# Patient Record
Sex: MALE | Race: WHITE | NOT HISPANIC OR LATINO | URBAN - METROPOLITAN AREA
[De-identification: names, ages, dates, MRNs, and addresses within clinical notes are randomized per-mention and may not be internally consistent; named-entity substitution may affect disease eponyms.]

---

## 2017-07-24 ENCOUNTER — APPOINTMENT (OUTPATIENT)
Dept: RADIOLOGY | Facility: MEDICAL CENTER | Age: 57
DRG: 308 | End: 2017-07-24
Attending: EMERGENCY MEDICINE
Payer: COMMERCIAL

## 2017-07-24 ENCOUNTER — HOSPITAL ENCOUNTER (INPATIENT)
Facility: MEDICAL CENTER | Age: 57
LOS: 2 days | DRG: 308 | End: 2017-07-26
Attending: EMERGENCY MEDICINE | Admitting: HOSPITALIST
Payer: COMMERCIAL

## 2017-07-24 ENCOUNTER — RESOLUTE PROFESSIONAL BILLING HOSPITAL PROF FEE (OUTPATIENT)
Dept: HOSPITALIST | Facility: MEDICAL CENTER | Age: 57
End: 2017-07-24
Payer: COMMERCIAL

## 2017-07-24 DIAGNOSIS — I48.91 ATRIAL FIBRILLATION, NEW ONSET (HCC): ICD-10-CM

## 2017-07-24 DIAGNOSIS — R06.02 SHORTNESS OF BREATH: ICD-10-CM

## 2017-07-24 PROBLEM — K21.9 GASTROESOPHAGEAL REFLUX DISEASE: Status: ACTIVE | Noted: 2017-07-24

## 2017-07-24 PROBLEM — D64.9 NORMOCHROMIC NORMOCYTIC ANEMIA: Status: ACTIVE | Noted: 2017-07-24

## 2017-07-24 PROBLEM — G47.33 OBSTRUCTIVE SLEEP APNEA: Status: ACTIVE | Noted: 2017-07-24

## 2017-07-24 PROBLEM — E66.01 MORBID OBESITY (HCC): Status: ACTIVE | Noted: 2017-07-24

## 2017-07-24 PROBLEM — E83.42 HYPOMAGNESEMIA: Status: ACTIVE | Noted: 2017-07-24

## 2017-07-24 PROBLEM — D68.9 COAGULOPATHY (HCC): Status: ACTIVE | Noted: 2017-07-24

## 2017-07-24 PROBLEM — D69.6 THROMBOCYTOPENIA (HCC): Status: ACTIVE | Noted: 2017-07-24

## 2017-07-24 PROBLEM — I10 HYPERTENSION: Status: ACTIVE | Noted: 2017-07-24

## 2017-07-24 PROBLEM — F10.10 ALCOHOL ABUSE: Status: ACTIVE | Noted: 2017-07-24

## 2017-07-24 LAB
ALBUMIN SERPL BCP-MCNC: 3.9 G/DL (ref 3.2–4.9)
ALBUMIN/GLOB SERPL: 1.2 G/DL
ALP SERPL-CCNC: 50 U/L (ref 30–99)
ALT SERPL-CCNC: 22 U/L (ref 2–50)
AMMONIA PLAS-SCNC: 34 UMOL/L (ref 11–45)
ANION GAP SERPL CALC-SCNC: 11 MMOL/L (ref 0–11.9)
APTT PPP: 35.2 SEC (ref 24.7–36)
AST SERPL-CCNC: 26 U/L (ref 12–45)
BASOPHILS # BLD AUTO: 1.2 % (ref 0–1.8)
BASOPHILS # BLD: 0.09 K/UL (ref 0–0.12)
BILIRUB SERPL-MCNC: 0.8 MG/DL (ref 0.1–1.5)
BNP SERPL-MCNC: 225 PG/ML (ref 0–100)
BNP SERPL-MCNC: 251 PG/ML (ref 0–100)
BUN SERPL-MCNC: 22 MG/DL (ref 8–22)
CALCIUM SERPL-MCNC: 9.2 MG/DL (ref 8.5–10.5)
CHLORIDE SERPL-SCNC: 102 MMOL/L (ref 96–112)
CO2 SERPL-SCNC: 25 MMOL/L (ref 20–33)
CREAT SERPL-MCNC: 1.12 MG/DL (ref 0.5–1.4)
DEPRECATED D DIMER PPP IA-ACNC: 652 NG/ML(D-DU)
EKG IMPRESSION: NORMAL
EOSINOPHIL # BLD AUTO: 0.18 K/UL (ref 0–0.51)
EOSINOPHIL NFR BLD: 2.4 % (ref 0–6.9)
ERYTHROCYTE [DISTWIDTH] IN BLOOD BY AUTOMATED COUNT: 47.7 FL (ref 35.9–50)
EST. AVERAGE GLUCOSE BLD GHB EST-MCNC: 146 MG/DL
GFR SERPL CREATININE-BSD FRML MDRD: >60 ML/MIN/1.73 M 2
GLOBULIN SER CALC-MCNC: 3.2 G/DL (ref 1.9–3.5)
GLUCOSE BLD-MCNC: 120 MG/DL (ref 65–99)
GLUCOSE BLD-MCNC: 129 MG/DL (ref 65–99)
GLUCOSE SERPL-MCNC: 109 MG/DL (ref 65–99)
HAV IGM SERPL QL IA: NEGATIVE
HBA1C MFR BLD: 6.7 % (ref 0–5.6)
HBV CORE IGM SER QL: NEGATIVE
HBV SURFACE AG SER QL: NEGATIVE
HCT VFR BLD AUTO: 38.6 % (ref 42–52)
HCV AB SER QL: NEGATIVE
HGB BLD-MCNC: 12.5 G/DL (ref 14–18)
HGB RETIC QN AUTO: 28.9 PG/CELL (ref 29–35)
IMM GRANULOCYTES # BLD AUTO: 0.02 K/UL (ref 0–0.11)
IMM GRANULOCYTES NFR BLD AUTO: 0.3 % (ref 0–0.9)
IMM RETICS NFR: 24.1 % (ref 9.3–17.4)
INR PPP: 1.14 (ref 0.87–1.13)
IRON SATN MFR SERPL: 13 % (ref 15–55)
IRON SERPL-MCNC: 76 UG/DL (ref 50–180)
LACTATE BLD-SCNC: 2 MMOL/L (ref 0.5–2)
LV EJECT FRACT  99904: 35
LYMPHOCYTES # BLD AUTO: 1.18 K/UL (ref 1–4.8)
LYMPHOCYTES NFR BLD: 15.5 % (ref 22–41)
MAGNESIUM SERPL-MCNC: 1.3 MG/DL (ref 1.5–2.5)
MCH RBC QN AUTO: 31 PG (ref 27–33)
MCHC RBC AUTO-ENTMCNC: 32.4 G/DL (ref 33.7–35.3)
MCV RBC AUTO: 95.8 FL (ref 81.4–97.8)
MONOCYTES # BLD AUTO: 0.75 K/UL (ref 0–0.85)
MONOCYTES NFR BLD AUTO: 9.8 % (ref 0–13.4)
NEUTROPHILS # BLD AUTO: 5.4 K/UL (ref 1.82–7.42)
NEUTROPHILS NFR BLD: 70.8 % (ref 44–72)
NRBC # BLD AUTO: 0.07 K/UL
NRBC BLD AUTO-RTO: 0.9 /100 WBC
PHOSPHATE SERPL-MCNC: 3.7 MG/DL (ref 2.5–4.5)
PLATELET # BLD AUTO: 148 K/UL (ref 164–446)
PMV BLD AUTO: 11.8 FL (ref 9–12.9)
POTASSIUM SERPL-SCNC: 3.7 MMOL/L (ref 3.6–5.5)
PROT SERPL-MCNC: 7.1 G/DL (ref 6–8.2)
PROTHROMBIN TIME: 15 SEC (ref 12–14.6)
RBC # BLD AUTO: 4.03 M/UL (ref 4.7–6.1)
RETICS # AUTO: 0.09 M/UL (ref 0.04–0.06)
RETICS/RBC NFR: 2.4 % (ref 0.8–2.1)
SODIUM SERPL-SCNC: 138 MMOL/L (ref 135–145)
T4 FREE SERPL-MCNC: 1.02 NG/DL (ref 0.53–1.43)
TIBC SERPL-MCNC: 584 UG/DL (ref 250–450)
TROPONIN I SERPL-MCNC: 0.03 NG/ML (ref 0–0.04)
TROPONIN I SERPL-MCNC: 0.05 NG/ML (ref 0–0.04)
TSH SERPL DL<=0.005 MIU/L-ACNC: 1.11 UIU/ML (ref 0.3–3.7)
TSH SERPL DL<=0.005 MIU/L-ACNC: 1.54 UIU/ML (ref 0.3–3.7)
WBC # BLD AUTO: 7.6 K/UL (ref 4.8–10.8)

## 2017-07-24 PROCEDURE — 93005 ELECTROCARDIOGRAM TRACING: CPT | Performed by: HOSPITALIST

## 2017-07-24 PROCEDURE — 700102 HCHG RX REV CODE 250 W/ 637 OVERRIDE(OP): Performed by: HOSPITALIST

## 2017-07-24 PROCEDURE — 71275 CT ANGIOGRAPHY CHEST: CPT

## 2017-07-24 PROCEDURE — 83735 ASSAY OF MAGNESIUM: CPT | Mod: 91

## 2017-07-24 PROCEDURE — 700101 HCHG RX REV CODE 250: Performed by: HOSPITALIST

## 2017-07-24 PROCEDURE — 85025 COMPLETE CBC W/AUTO DIFF WBC: CPT

## 2017-07-24 PROCEDURE — 80074 ACUTE HEPATITIS PANEL: CPT

## 2017-07-24 PROCEDURE — 71010 DX-CHEST-PORTABLE (1 VIEW): CPT

## 2017-07-24 PROCEDURE — 83550 IRON BINDING TEST: CPT

## 2017-07-24 PROCEDURE — 700111 HCHG RX REV CODE 636 W/ 250 OVERRIDE (IP): Performed by: HOSPITALIST

## 2017-07-24 PROCEDURE — 84100 ASSAY OF PHOSPHORUS: CPT | Mod: 91

## 2017-07-24 PROCEDURE — 85046 RETICYTE/HGB CONCENTRATE: CPT

## 2017-07-24 PROCEDURE — 93005 ELECTROCARDIOGRAM TRACING: CPT | Performed by: EMERGENCY MEDICINE

## 2017-07-24 PROCEDURE — 80061 LIPID PANEL: CPT

## 2017-07-24 PROCEDURE — 83605 ASSAY OF LACTIC ACID: CPT

## 2017-07-24 PROCEDURE — A9270 NON-COVERED ITEM OR SERVICE: HCPCS | Performed by: HOSPITALIST

## 2017-07-24 PROCEDURE — 85379 FIBRIN DEGRADATION QUANT: CPT

## 2017-07-24 PROCEDURE — 36415 COLL VENOUS BLD VENIPUNCTURE: CPT

## 2017-07-24 PROCEDURE — 700105 HCHG RX REV CODE 258: Performed by: HOSPITALIST

## 2017-07-24 PROCEDURE — 83036 HEMOGLOBIN GLYCOSYLATED A1C: CPT

## 2017-07-24 PROCEDURE — 99223 1ST HOSP IP/OBS HIGH 75: CPT | Performed by: HOSPITALIST

## 2017-07-24 PROCEDURE — 700102 HCHG RX REV CODE 250 W/ 637 OVERRIDE(OP): Performed by: STUDENT IN AN ORGANIZED HEALTH CARE EDUCATION/TRAINING PROGRAM

## 2017-07-24 PROCEDURE — A9270 NON-COVERED ITEM OR SERVICE: HCPCS | Performed by: STUDENT IN AN ORGANIZED HEALTH CARE EDUCATION/TRAINING PROGRAM

## 2017-07-24 PROCEDURE — 93306 TTE W/DOPPLER COMPLETE: CPT

## 2017-07-24 PROCEDURE — 84439 ASSAY OF FREE THYROXINE: CPT

## 2017-07-24 PROCEDURE — 82140 ASSAY OF AMMONIA: CPT | Mod: 91

## 2017-07-24 PROCEDURE — 80053 COMPREHEN METABOLIC PANEL: CPT

## 2017-07-24 PROCEDURE — 80048 BASIC METABOLIC PNL TOTAL CA: CPT

## 2017-07-24 PROCEDURE — 93005 ELECTROCARDIOGRAM TRACING: CPT

## 2017-07-24 PROCEDURE — 83880 ASSAY OF NATRIURETIC PEPTIDE: CPT

## 2017-07-24 PROCEDURE — 85027 COMPLETE CBC AUTOMATED: CPT

## 2017-07-24 PROCEDURE — 99285 EMERGENCY DEPT VISIT HI MDM: CPT

## 2017-07-24 PROCEDURE — 700111 HCHG RX REV CODE 636 W/ 250 OVERRIDE (IP): Performed by: INTERNAL MEDICINE

## 2017-07-24 PROCEDURE — 85730 THROMBOPLASTIN TIME PARTIAL: CPT

## 2017-07-24 PROCEDURE — 84443 ASSAY THYROID STIM HORMONE: CPT

## 2017-07-24 PROCEDURE — 93306 TTE W/DOPPLER COMPLETE: CPT | Mod: 26 | Performed by: INTERNAL MEDICINE

## 2017-07-24 PROCEDURE — 84484 ASSAY OF TROPONIN QUANT: CPT | Mod: 91

## 2017-07-24 PROCEDURE — 770020 HCHG ROOM/CARE - TELE (206)

## 2017-07-24 PROCEDURE — 82962 GLUCOSE BLOOD TEST: CPT

## 2017-07-24 PROCEDURE — 85610 PROTHROMBIN TIME: CPT

## 2017-07-24 PROCEDURE — 83540 ASSAY OF IRON: CPT

## 2017-07-24 RX ORDER — DILTIAZEM HYDROCHLORIDE 5 MG/ML
0.25 INJECTION INTRAVENOUS ONCE
Status: DISCONTINUED | OUTPATIENT
Start: 2017-07-24 | End: 2017-07-24

## 2017-07-24 RX ORDER — OMEPRAZOLE 20 MG/1
20 CAPSULE, DELAYED RELEASE ORAL DAILY
Status: DISCONTINUED | OUTPATIENT
Start: 2017-07-24 | End: 2017-07-26 | Stop reason: HOSPADM

## 2017-07-24 RX ORDER — CLONIDINE HYDROCHLORIDE 0.1 MG/1
0.1 TABLET ORAL EVERY 6 HOURS PRN
Status: DISCONTINUED | OUTPATIENT
Start: 2017-07-24 | End: 2017-07-26 | Stop reason: HOSPADM

## 2017-07-24 RX ORDER — MAGNESIUM SULFATE HEPTAHYDRATE 40 MG/ML
2 INJECTION, SOLUTION INTRAVENOUS ONCE
Status: COMPLETED | OUTPATIENT
Start: 2017-07-24 | End: 2017-07-24

## 2017-07-24 RX ORDER — LORAZEPAM 0.5 MG/1
0.5 TABLET ORAL EVERY 4 HOURS PRN
Status: DISCONTINUED | OUTPATIENT
Start: 2017-07-24 | End: 2017-07-26 | Stop reason: HOSPADM

## 2017-07-24 RX ORDER — BISACODYL 10 MG
10 SUPPOSITORY, RECTAL RECTAL
Status: DISCONTINUED | OUTPATIENT
Start: 2017-07-24 | End: 2017-07-26 | Stop reason: HOSPADM

## 2017-07-24 RX ORDER — POLYETHYLENE GLYCOL 3350 17 G/17G
1 POWDER, FOR SOLUTION ORAL
Status: DISCONTINUED | OUTPATIENT
Start: 2017-07-24 | End: 2017-07-26 | Stop reason: HOSPADM

## 2017-07-24 RX ORDER — LORAZEPAM 2 MG/ML
0.5 INJECTION INTRAMUSCULAR EVERY 6 HOURS PRN
Status: DISCONTINUED | OUTPATIENT
Start: 2017-07-24 | End: 2017-07-24

## 2017-07-24 RX ORDER — LORAZEPAM 1 MG/1
0.5 TABLET ORAL EVERY 6 HOURS PRN
Status: DISCONTINUED | OUTPATIENT
Start: 2017-07-24 | End: 2017-07-24

## 2017-07-24 RX ORDER — LORAZEPAM 2 MG/ML
1.5 INJECTION INTRAMUSCULAR
Status: DISCONTINUED | OUTPATIENT
Start: 2017-07-24 | End: 2017-07-26 | Stop reason: HOSPADM

## 2017-07-24 RX ORDER — ONDANSETRON 2 MG/ML
4 INJECTION INTRAMUSCULAR; INTRAVENOUS EVERY 4 HOURS PRN
Status: DISCONTINUED | OUTPATIENT
Start: 2017-07-24 | End: 2017-07-26 | Stop reason: HOSPADM

## 2017-07-24 RX ORDER — OMEPRAZOLE 20 MG/1
20 CAPSULE, DELAYED RELEASE ORAL DAILY
COMMUNITY

## 2017-07-24 RX ORDER — LORAZEPAM 1 MG/1
1 TABLET ORAL EVERY 4 HOURS PRN
Status: DISCONTINUED | OUTPATIENT
Start: 2017-07-24 | End: 2017-07-26 | Stop reason: HOSPADM

## 2017-07-24 RX ORDER — LORAZEPAM 2 MG/ML
2 INJECTION INTRAMUSCULAR
Status: DISCONTINUED | OUTPATIENT
Start: 2017-07-24 | End: 2017-07-26 | Stop reason: HOSPADM

## 2017-07-24 RX ORDER — ACETAMINOPHEN 325 MG/1
650 TABLET ORAL EVERY 6 HOURS PRN
Status: DISCONTINUED | OUTPATIENT
Start: 2017-07-24 | End: 2017-07-26 | Stop reason: HOSPADM

## 2017-07-24 RX ORDER — DILTIAZEM HYDROCHLORIDE 5 MG/ML
10 INJECTION INTRAVENOUS 4 TIMES DAILY PRN
Status: DISCONTINUED | OUTPATIENT
Start: 2017-07-24 | End: 2017-07-26 | Stop reason: HOSPADM

## 2017-07-24 RX ORDER — PROMETHAZINE HYDROCHLORIDE 25 MG/1
12.5-25 TABLET ORAL EVERY 4 HOURS PRN
Status: DISCONTINUED | OUTPATIENT
Start: 2017-07-24 | End: 2017-07-26 | Stop reason: HOSPADM

## 2017-07-24 RX ORDER — HYDROCHLOROTHIAZIDE 25 MG/1
25 TABLET ORAL DAILY
Status: ON HOLD | COMMUNITY
End: 2017-07-26

## 2017-07-24 RX ORDER — FOLIC ACID 1 MG/1
1 TABLET ORAL DAILY
Status: DISCONTINUED | OUTPATIENT
Start: 2017-07-25 | End: 2017-07-26 | Stop reason: HOSPADM

## 2017-07-24 RX ORDER — LORAZEPAM 1 MG/1
4 TABLET ORAL
Status: DISCONTINUED | OUTPATIENT
Start: 2017-07-24 | End: 2017-07-26 | Stop reason: HOSPADM

## 2017-07-24 RX ORDER — THIAMINE MONONITRATE (VIT B1) 100 MG
100 TABLET ORAL DAILY
Status: DISCONTINUED | OUTPATIENT
Start: 2017-07-25 | End: 2017-07-26 | Stop reason: HOSPADM

## 2017-07-24 RX ORDER — AMOXICILLIN 250 MG
2 CAPSULE ORAL 2 TIMES DAILY
Status: DISCONTINUED | OUTPATIENT
Start: 2017-07-24 | End: 2017-07-26 | Stop reason: HOSPADM

## 2017-07-24 RX ORDER — DILTIAZEM HYDROCHLORIDE 5 MG/ML
10 INJECTION INTRAVENOUS ONCE
Status: COMPLETED | OUTPATIENT
Start: 2017-07-24 | End: 2017-07-24

## 2017-07-24 RX ORDER — LORAZEPAM 1 MG/1
2 TABLET ORAL
Status: DISCONTINUED | OUTPATIENT
Start: 2017-07-24 | End: 2017-07-26 | Stop reason: HOSPADM

## 2017-07-24 RX ORDER — DEXTROSE MONOHYDRATE 25 G/50ML
25 INJECTION, SOLUTION INTRAVENOUS
Status: DISCONTINUED | OUTPATIENT
Start: 2017-07-24 | End: 2017-07-26 | Stop reason: HOSPADM

## 2017-07-24 RX ORDER — LABETALOL HYDROCHLORIDE 5 MG/ML
10 INJECTION, SOLUTION INTRAVENOUS EVERY 4 HOURS PRN
Status: DISCONTINUED | OUTPATIENT
Start: 2017-07-24 | End: 2017-07-24

## 2017-07-24 RX ORDER — PROMETHAZINE HYDROCHLORIDE 25 MG/1
12.5-25 SUPPOSITORY RECTAL EVERY 4 HOURS PRN
Status: DISCONTINUED | OUTPATIENT
Start: 2017-07-24 | End: 2017-07-26 | Stop reason: HOSPADM

## 2017-07-24 RX ORDER — LORAZEPAM 2 MG/ML
1 INJECTION INTRAMUSCULAR
Status: DISCONTINUED | OUTPATIENT
Start: 2017-07-24 | End: 2017-07-26 | Stop reason: HOSPADM

## 2017-07-24 RX ORDER — LORAZEPAM 1 MG/1
3 TABLET ORAL
Status: DISCONTINUED | OUTPATIENT
Start: 2017-07-24 | End: 2017-07-26 | Stop reason: HOSPADM

## 2017-07-24 RX ORDER — LOSARTAN POTASSIUM 50 MG/1
50 TABLET ORAL DAILY
Status: DISCONTINUED | OUTPATIENT
Start: 2017-07-24 | End: 2017-07-26

## 2017-07-24 RX ORDER — LOSARTAN POTASSIUM 50 MG/1
50 TABLET ORAL DAILY
COMMUNITY

## 2017-07-24 RX ORDER — CHLORDIAZEPOXIDE HYDROCHLORIDE 25 MG/1
25 CAPSULE, GELATIN COATED ORAL EVERY 6 HOURS
Status: DISCONTINUED | OUTPATIENT
Start: 2017-07-25 | End: 2017-07-25

## 2017-07-24 RX ORDER — LORAZEPAM 2 MG/ML
0.5 INJECTION INTRAMUSCULAR EVERY 4 HOURS PRN
Status: DISCONTINUED | OUTPATIENT
Start: 2017-07-24 | End: 2017-07-26 | Stop reason: HOSPADM

## 2017-07-24 RX ORDER — METOPROLOL TARTRATE 50 MG/1
50 TABLET, FILM COATED ORAL TWICE DAILY
Status: DISCONTINUED | OUTPATIENT
Start: 2017-07-24 | End: 2017-07-26

## 2017-07-24 RX ORDER — IBUPROFEN 200 MG
1000 TABLET ORAL EVERY 6 HOURS PRN
Status: ON HOLD | COMMUNITY
End: 2017-07-26

## 2017-07-24 RX ORDER — ONDANSETRON 4 MG/1
4 TABLET, ORALLY DISINTEGRATING ORAL EVERY 4 HOURS PRN
Status: DISCONTINUED | OUTPATIENT
Start: 2017-07-24 | End: 2017-07-26 | Stop reason: HOSPADM

## 2017-07-24 RX ORDER — CHLORDIAZEPOXIDE HYDROCHLORIDE 25 MG/1
50 CAPSULE, GELATIN COATED ORAL EVERY 6 HOURS
Status: DISCONTINUED | OUTPATIENT
Start: 2017-07-24 | End: 2017-07-25

## 2017-07-24 RX ADMIN — DILTIAZEM HYDROCHLORIDE 10 MG: 5 INJECTION INTRAVENOUS at 15:26

## 2017-07-24 RX ADMIN — STANDARDIZED SENNA CONCENTRATE AND DOCUSATE SODIUM 2 TABLET: 8.6; 5 TABLET, FILM COATED ORAL at 20:59

## 2017-07-24 RX ADMIN — CHLORDIAZEPOXIDE HYDROCHLORIDE 50 MG: 25 CAPSULE ORAL at 20:59

## 2017-07-24 RX ADMIN — METOPROLOL TARTRATE 50 MG: 50 TABLET, FILM COATED ORAL at 15:25

## 2017-07-24 RX ADMIN — ACETAMINOPHEN 650 MG: 325 TABLET, FILM COATED ORAL at 20:58

## 2017-07-24 RX ADMIN — RIVAROXABAN 20 MG: 20 TABLET, FILM COATED ORAL at 15:24

## 2017-07-24 RX ADMIN — MAGNESIUM SULFATE IN WATER 2 G: 40 INJECTION, SOLUTION INTRAVENOUS at 15:58

## 2017-07-24 RX ADMIN — CHLORDIAZEPOXIDE HYDROCHLORIDE 50 MG: 25 CAPSULE ORAL at 14:16

## 2017-07-24 RX ADMIN — METFORMIN HYDROCHLORIDE 500 MG: 500 TABLET, FILM COATED ORAL at 18:15

## 2017-07-24 RX ADMIN — LOSARTAN POTASSIUM 50 MG: 50 TABLET, FILM COATED ORAL at 14:15

## 2017-07-24 RX ADMIN — OMEPRAZOLE 20 MG: 20 CAPSULE, DELAYED RELEASE ORAL at 14:15

## 2017-07-24 RX ADMIN — POTASSIUM CHLORIDE: 2 INJECTION, SOLUTION, CONCENTRATE INTRAVENOUS at 18:15

## 2017-07-24 ASSESSMENT — LIFESTYLE VARIABLES
ORIENTATION AND CLOUDING OF SENSORIUM: ORIENTED AND CAN DO SERIAL ADDITIONS
ORIENTATION AND CLOUDING OF SENSORIUM: ORIENTED AND CAN DO SERIAL ADDITIONS
ANXIETY: NO ANXIETY (AT EASE)
ORIENTATION AND CLOUDING OF SENSORIUM: ORIENTED AND CAN DO SERIAL ADDITIONS
EVER_SMOKED: YES
TOTAL SCORE: 2
TOTAL SCORE: 2
NAUSEA AND VOMITING: NO NAUSEA AND NO VOMITING
HAVE PEOPLE ANNOYED YOU BY CRITICIZING YOUR DRINKING: NO
AGITATION: NORMAL ACTIVITY
AUDITORY DISTURBANCES: NOT PRESENT
PAROXYSMAL SWEATS: BARELY PERCEPTIBLE SWEATING. PALMS MOIST
NAUSEA AND VOMITING: NO NAUSEA AND NO VOMITING
AUDITORY DISTURBANCES: NOT PRESENT
ON A TYPICAL DAY WHEN YOU DRINK ALCOHOL HOW MANY DRINKS DO YOU HAVE: 8
EVER HAD A DRINK FIRST THING IN THE MORNING TO STEADY YOUR NERVES TO GET RID OF A HANGOVER: NO
HEADACHE, FULLNESS IN HEAD: NOT PRESENT
AUDITORY DISTURBANCES: NOT PRESENT
TREMOR: NO TREMOR
CONSUMPTION TOTAL: POSITIVE
PAROXYSMAL SWEATS: NO SWEAT VISIBLE
TREMOR: NO TREMOR
EVER_SMOKED: YES
TOTAL SCORE: 0
VISUAL DISTURBANCES: NOT PRESENT
PAROXYSMAL SWEATS: NO SWEAT VISIBLE
HOW MANY TIMES IN THE PAST YEAR HAVE YOU HAD 5 OR MORE DRINKS IN A DAY: 350
ANXIETY: NO ANXIETY (AT EASE)
TOTAL SCORE: 1
VISUAL DISTURBANCES: NOT PRESENT
HEADACHE, FULLNESS IN HEAD: NOT PRESENT
HEADACHE, FULLNESS IN HEAD: NOT PRESENT
VISUAL DISTURBANCES: NOT PRESENT
AGITATION: NORMAL ACTIVITY
HAVE YOU EVER FELT YOU SHOULD CUT DOWN ON YOUR DRINKING: YES
TREMOR: NO TREMOR
TOTAL SCORE: 0
AVERAGE NUMBER OF DAYS PER WEEK YOU HAVE A DRINK CONTAINING ALCOHOL: 7
EVER FELT BAD OR GUILTY ABOUT YOUR DRINKING: YES
TOTAL SCORE: 2
AGITATION: NORMAL ACTIVITY
NAUSEA AND VOMITING: NO NAUSEA AND NO VOMITING
ANXIETY: NO ANXIETY (AT EASE)
ALCOHOL_USE: YES

## 2017-07-24 ASSESSMENT — ENCOUNTER SYMPTOMS
SHORTNESS OF BREATH: 1
DIARRHEA: 0
COUGH: 1
VOMITING: 0

## 2017-07-24 ASSESSMENT — PATIENT HEALTH QUESTIONNAIRE - PHQ9
1. LITTLE INTEREST OR PLEASURE IN DOING THINGS: NOT AT ALL
2. FEELING DOWN, DEPRESSED, IRRITABLE, OR HOPELESS: NOT AT ALL
SUM OF ALL RESPONSES TO PHQ9 QUESTIONS 1 AND 2: 0
SUM OF ALL RESPONSES TO PHQ QUESTIONS 1-9: 0

## 2017-07-24 ASSESSMENT — COPD QUESTIONNAIRES
DURING THE PAST 4 WEEKS HOW MUCH DID YOU FEEL SHORT OF BREATH: NONE/LITTLE OF THE TIME
DO YOU EVER COUGH UP ANY MUCUS OR PHLEGM?: NO/ONLY WITH OCCASIONAL COLDS OR INFECTIONS
HAVE YOU SMOKED AT LEAST 100 CIGARETTES IN YOUR ENTIRE LIFE: NO/DON'T KNOW
COPD SCREENING SCORE: 1

## 2017-07-24 ASSESSMENT — PAIN SCALES - GENERAL
PAINLEVEL_OUTOF10: 2
PAINLEVEL_OUTOF10: 0
PAINLEVEL_OUTOF10: 0
PAINLEVEL_OUTOF10: 2

## 2017-07-24 NOTE — CONSULTS
Cardiology consultation  This cardiology consultation is dictated on the Epic format as hospital dictation system is currently not operating.    Chief complaint:   Atrial fibrillation with rapid response    History of present illness:  57-year-old gentleman seen at the request of Dr. Brown in the emergency room for evaluation of atrial fibrillation. The patient is visiting here from the [place] area. For about the past 2 months, he has noticed some exertional dyspnea. In fact, he noted that he had to stop after walking 2 or 3 blocks to catch his breath. His exertional dyspnea has become progressively worse. 5 days ago he noticed he has some swelling of his ankles. There is no history of chest pain, chest pressure or known coronary disease. There is no history of orthopnea. There is also no history of palpitations or presyncope.  He and his wife came up to [facility] for visit, and last night, while he was walking to the [employer], he felt weak and short of breath. He went back to his room instead of continuing out to dinner. This morning he felt more short of breath and was taken to the [facility] and [facility] and then transferred to [place] for further evaluation and treatment.  His cardiac risk factor profile is positive for diabetes mellitus 2, hypertension and untreated sleep apnea. He is a nonsmoker and denies any cholesterol problems.  The patient likely has sleep apnea. He has refused evaluation of this several times in the past according to his report.  Medications: HCTZ losartan.  Surgeries: Left rotator cuff, left carpal tunnel, left knee surgery.  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    Review of systems:  Constitutional: No weight loss  Pulmonary: denies asthma or emphysema. Suspected sleep apnea  Cardiac: As above  GI: No melena no rectal bleeding  Genitourinary: No history of prostate cancer or hematuria  Muscle skeletal: No recent trauma  Endocrine: History  of diabetes, No weight loss, no history of thyroid disease.    Physical examination:  Resting comfortably no tachypnea. Blood pressure 165/87 heart rate is 120 and irregular. Atrial fib noted on the monitor.  HEENT: Pupils equal gaze conjugate square nonicteric. Neck is obese cannot tell JVD. No bruit.  Lungs: Clear to auscultation  Heart: Irregularly irregular rhythm no S3 no murmur  Abdomen: Obese soft without hepatomegaly no masses  Extremities: No edema posterior tibial pulses 2+  Skin: Warm and dry  Neuro: Alert cooperative no facial droop    EKG: Personally reviewed. Atrial fibrillation with rapid ventricular response, low voltage, no acute changes.  Lab: Notable for anemia, glucose 109, , TSH 1.5.    Impression:  Atrial fibrillation: Duration is uncertain. He is been symptomatic for about 2 months with progressive exertional dyspnea. I suspect that in atrial fibrillation for at least that long.  Anemia: Etiology is uncertain. Denies melena. Guaiacs will be ordered to [facility] not having GI bleeding. Also check reticulocyte count. Would assume that he would be polycythemic based on his history.  Undiagnosed sleep apnea: History of snoring. His refused workup in the past. The importance of this was discussed today.  Hypertension:  Alcohol abuse: This was discussed with patient today. The hospitalist team is treating potential withdrawal.    Recommendations:  Controlled ventricular response rate. He was started on beta blockers and as needed diltiazem. He'll be anticoagulated with one of the newer anticoagulants. As noted above, he is anemic and we will need to evaluate this before committing him to long-term anticoagulation.  He needs a workup for obstructive sleep apnea but this could be safely done at home after he is discharged from the hospital.

## 2017-07-24 NOTE — ED PROVIDER NOTES
ED Provider Note    Scribed for Jonnathan Brown M.D. by Lino Payne. 7/24/2017, 9:03 AM.    Primary care provider: None noted  Means of arrival: EMS  History obtained from: Patient  History limited by: None    CHIEF COMPLAINT  Chief Complaint   Patient presents with   • Shortness of Breath       HPI  Skip Moon is a 57 y.o. male who presents to the Emergency Department complaining of shortness of breath which has been going on for one month. Patient states he recently came from [place]. He believes part of the cause may be the altitude change. Patient reports exacerbation of shortness of breath when laying down and with exertion. He also reports associated weakness and intermittent cough. Patient denies any chest pain, shakiness, vomiting, or diarrhea. He mentions drinking large amounts of hard liquor. He has been trying to decrease liquor consumption and reports completely stopping consumption when coming to [place] for a certain amount of time. The shortness of breath then worsened.  Patient also smokes pot, 1 joint per day. He denies history of atrial fibrillation or blood clots. He has hypertension and diabetes for which he takes metformin.  No history of PE or DVT.  Chronic lower extremity asymmetry from previous knee injury    REVIEW OF SYSTEMS  Review of Systems   Respiratory: Positive for cough and shortness of breath.    Cardiovascular: Negative for chest pain.   Gastrointestinal: Negative for vomiting and diarrhea.   Neurological:        Positive generalized weakness  Negative shakiness   All other systems reviewed and are negative.  C    PAST MEDICAL HISTORY   Hypertension, diabetes    SURGICAL HISTORY  patient denies any surgical history    SOCIAL HISTORY  None noted    FAMILY HISTORY  None noted    CURRENT MEDICATIONS  Current medications can be seen on the nurse's note.     ALLERGIES  No Known Allergies    PHYSICAL EXAM  VITAL SIGNS: /87 mmHg  Pulse 115  Temp(Src) 36.9 °C (98.4  °F)  Resp 18  Ht 1.829 m (6')  Wt 165.563 kg (365 lb)  BMI 49.49 kg/m2  Vitals reviewed.  Constitutional: Well developed, Well nourished, No acute distress. Chronically ill appearing  HENT: Normocephalic, Atraumatic, Bilateral external ears normal, Oropharynx moist, No oral exudates, Nose normal.   Eyes: PERRL, EOMI, Conjunctiva normal, No discharge.   Neck: Normal range of motion, No tenderness, Supple, No stridor.   Cardiovascular: Tachycardic, irregularly irregular, No murmurs, No rubs, No gallops.   Thorax & Lungs: Normal breath sounds, No respiratory distress, No wheezing, No chest tenderness.   Abdomen: Bowel sounds normal, Soft, No tenderness  Skin: Warm, Dry, No erythema, No rash.   Back: No tenderness, No CVA tenderness.   Musculoskeletal: Good range of motion in all major joints. No edema. No tenderness to palpation or major deformities noted.  Asymmetric swelling.  Right leg is much larger than left  Neurologic: Alert, Normal motor function, Normal sensory function, No focal deficits noted.   Psychiatric: Affect normal    LABS  Results for orders placed or performed during the hospital encounter of 07/24/17   CBC w/ Differential   Result Value Ref Range    WBC 7.6 4.8 - 10.8 K/uL    RBC 4.03 (L) 4.70 - 6.10 M/uL    Hemoglobin 12.5 (L) 14.0 - 18.0 g/dL    Hematocrit 38.6 (L) 42.0 - 52.0 %    MCV 95.8 81.4 - 97.8 fL    MCH 31.0 27.0 - 33.0 pg    MCHC 32.4 (L) 33.7 - 35.3 g/dL    RDW 47.7 35.9 - 50.0 fL    Platelet Count 148 (L) 164 - 446 K/uL    MPV 11.8 9.0 - 12.9 fL    Neutrophils-Polys 70.80 44.00 - 72.00 %    Lymphocytes 15.50 (L) 22.00 - 41.00 %    Monocytes 9.80 0.00 - 13.40 %    Eosinophils 2.40 0.00 - 6.90 %    Basophils 1.20 0.00 - 1.80 %    Immature Granulocytes 0.30 0.00 - 0.90 %    Nucleated RBC 0.90 /100 WBC    Neutrophils (Absolute) 5.40 1.82 - 7.42 K/uL    Lymphs (Absolute) 1.18 1.00 - 4.80 K/uL    Monos (Absolute) 0.75 0.00 - 0.85 K/uL    Eos (Absolute) 0.18 0.00 - 0.51 K/uL    Baso  (Absolute) 0.09 0.00 - 0.12 K/uL    Immature Granulocytes (abs) 0.02 0.00 - 0.11 K/uL    NRBC (Absolute) 0.07 K/uL   Complete Metabolic Panel (CMP)   Result Value Ref Range    Sodium 138 135 - 145 mmol/L    Potassium 3.7 3.6 - 5.5 mmol/L    Chloride 102 96 - 112 mmol/L    Co2 25 20 - 33 mmol/L    Anion Gap 11.0 0.0 - 11.9    Glucose 109 (H) 65 - 99 mg/dL    Bun 22 8 - 22 mg/dL    Creatinine 1.12 0.50 - 1.40 mg/dL    Calcium 9.2 8.5 - 10.5 mg/dL    AST(SGOT) 26 12 - 45 U/L    ALT(SGPT) 22 2 - 50 U/L    Alkaline Phosphatase 50 30 - 99 U/L    Total Bilirubin 0.8 0.1 - 1.5 mg/dL    Albumin 3.9 3.2 - 4.9 g/dL    Total Protein 7.1 6.0 - 8.2 g/dL    Globulin 3.2 1.9 - 3.5 g/dL    A-G Ratio 1.2 g/dL   Btype Natriuretic Peptide   Result Value Ref Range    B Natriuretic Peptide 225 (H) 0 - 100 pg/mL   Troponin STAT   Result Value Ref Range    Troponin I 0.03 0.00 - 0.04 ng/mL   LACTIC ACID   Result Value Ref Range    Lactic Acid 2.0 0.5 - 2.0 mmol/L   PT/INR   Result Value Ref Range    PT 15.0 (H) 12.0 - 14.6 sec    INR 1.14 (H) 0.87 - 1.13   APTT   Result Value Ref Range    APTT 35.2 24.7 - 36.0 sec   D-Dimer (only helpful in low pre-test probability wells critieria. Do not order if patient ruled out by PERC criteria. See Weblinks at top of Labs section)   Result Value Ref Range    D-Dimer Screen 652 (H) <250 ng/mL(D-DU)   ESTIMATED GFR   Result Value Ref Range    GFR If African American >60 >60 mL/min/1.73 m 2    GFR If Non African American >60 >60 mL/min/1.73 m 2      All labs reviewed by me.    EKG Interpretation  Interpreted by me    EKG Interpretation    Interpreted by me    Rhythm: atrial fibrillation - rapid  Rate: tachycardia  Axis: normal  Ectopy: none  Conduction: irregularly irregular  ST Segments: nonspecific changes  T Waves: no acute change  Q Waves: nonspecific    Clinical Impression: atrial fibrillation with rapid ventricular rate    RADIOLOGY  CT-CTA CHEST PULMONARY ARTERY W/ RECONS   Final Result      1.   No evidence of pulmonary embolus.      2.  Bibasilar atelectasis, right greater than left. There is a small right pleural effusion.      3.  Multiple pulmonary nodules within the right upper and lower lobes which measure up to 5 mm in size.      4.  Borderline enlarged mediastinal lymph nodes.      5.  Gallstones.      6.  Fatty liver.      Fleischner Society Guideline (Radiology 237:2005) pulmonary nodule recommendations( >4-6 mm):      For low risk patients, follow-up at 12 months. If no change, no further imaging is needed.   For high risk patients, initial follow-up CT at 6-12 months and then at 18-24 months if no change.      DX-CHEST-PORTABLE (1 VIEW)   Final Result      Cardiomegaly with interstitial pulmonary edema.      Echocardiogram Comp W/O Cont    (Results Pending)   NM-CARDIAC STRESS TEST    (Results Pending)     The radiologist's interpretation of all radiological studies have been reviewed by me.    COURSE & MEDICAL DECISION MAKING  Pertinent Labs & Imaging studies reviewed. (See chart for details)        9:03 AM Patient seen and examined at bedside. The patient presents with shortness of breath, and the differential diagnosis includes but is not limited to , CHF, PE, MI, new onset A. fib N [place] or arrhythmia.  Ordered for DX chest, lactic acid, CBC, CMP, BNP, troponin stat, lactic acid, PT/INR, APTT, TSH, D-dimer, estimated GFR, EKG to evaluate.  The patient did not require rate control.      Patient is admitted to the hospitals in guarded condition.  Primarily is admitted for new onset A. fib.  CT of the chest was negative for PE.  He did have pulmonary nodules, which I did talk to the patient about.  He is given a copy of the CT report and told to follow-up with his doctor.  Patient and family agree.    1:29 PM Paged cardiology    1:40 PM Patient reevaluated at bedside. Discussed lab and radiology results as seen above. Discussed further plan of care which includes admission to hospital for  further evaluation and care. Patient understands and agrees to plan.             DISPOSITION:  Patient will be admitted to Dr. Ortiz in guarded condition.     FINAL IMPRESSION  1. Atrial fibrillation, new onset (CMS-HCC)    2. Shortness of breath     3.  Atrial fibrillation with rapid ventricular response     Lino COYLE (Scribe), am scribing for, and in the presence of, Jonnathan Brown M.D..    Electronically signed by: Lino Payne (Scribe), 7/24/2017    Jonnathan COYLE M.D. personally performed the services described in this documentation, as scribed by Lino Payne in my presence, and it is both accurate and complete.    The note accurately reflects work and decisions made by me.  Jonnathan Brown  7/24/2017  1:59 PM

## 2017-07-24 NOTE — CARE PLAN
Problem: Communication  Goal: The ability to communicate needs accurately and effectively will improve  Outcome: PROGRESSING AS EXPECTED  Pt oriented to the floor. Questions and concerns addressed and answered. Pt aware of plan of care.     Problem: Safety  Goal: Will remain free from falls  Outcome: PROGRESSING AS EXPECTED  Pt assessed for fall risk. Proper precautions in place. Treaded slipper socks on pt. Bed in low position, wheels locked, side rails up x2, call light within reach.

## 2017-07-24 NOTE — ED NOTES
"Med rec updated and complete  Allergies reviewed  Pt states \"No antibiotics in the last 30 days\".  Pt states \"No vitamins\".      "
Pt arrived via ems, per ems pt has been having sob x1 month, today pt drove drove to [place] from  and the sob increases. U/a of ems, ems found pt to be A-FIB,  No hx of. U/a pt sitting upcaox4 speaking in full sentences no distress, no sob at this time, no cp, no abd pain, a-fib on monitor of 121  
Pt resting comfortably in bed, in no distress, bed in lowered position, side rails up, call light in reach  
Pt sitting up comfortably, talking with wife at bedside. Bed in lowered position, side rails up, call light in reach  
No

## 2017-07-24 NOTE — IP AVS SNAPSHOT
[facility] Access Code: [email or web address]  Expires: 8/25/2017  3:44 PM    Your email address is not on file at [facility].  Email Addresses are required for you to sign up for [facility], please contact [phone] to verify your personal information and to provide your email address prior to attempting to register for [facility].    Laith Moon  [address] [address]    [facility]  A secure, online tool to manage your health information     [facility]’s [facility]® is a secure, online tool that connects you to your personalized health information from the privacy of your home -- day or night - making it very easy for you to manage your healthcare. Once the activation process is completed, you can even access your medical information using the [facility] barbara, which is available for free in the Apple Barbara store or Google Play store.     To learn more about [facility], visit [email or web address]    There are two levels of access available (as shown below):   My Chart Features  [facility] Primary Care Doctor [facility]  Specialists [facility]  Urgent  Care Non-[facility] Primary Care Doctor   Email your healthcare team securely and privately 24/7 X X X    Manage appointments: schedule your next appointment; view details of past/upcoming appointments X      Request prescription refills. X      View recent personal medical records, including lab and immunizations X X X X   View health record, including health history, allergies, medications X X X X   Read reports about your outpatient visits, procedures, consult and ER notes X X X X   See your discharge summary, which is a recap of your hospital and/or ER visit that includes your diagnosis, lab results, and care plan X X  X     How to register for [facility]:  Once your e-mail address has been verified, follow the following steps to sign up for [facility].     1. Go to  [email or web address]  2. Click on the Sign Up Now box, which takes you to the New Member Sign Up  page. You will need to provide the following information:  a. Enter your [facility] Access Code exactly as it appears at the top of this page. (You will not need to use this code after you’ve completed the sign-up process. If you do not sign up before the expiration date, you must request a new code.)   b. Enter your date of birth.   c. Enter your home email address.   d. Click Submit, and follow the next screen’s instructions.  3. Create a goOutMap ID. This will be your [facility] login ID and cannot be changed, so think of one that is secure and easy to remember.  4. Create a [facility] password. You can change your password at any time.  5. Enter your Password Reset Question and Answer. This can be used at a later time if you forget your password.   6. Enter your e-mail address. This allows you to receive e-mail notifications when new information is available in [facility]OutMap.  7. Click Sign Up. You can now view your health information.    For assistance activating your [facility] account, call [phone]

## 2017-07-24 NOTE — IP AVS SNAPSHOT
7/26/2017    Laith Moon  [id number]  [place] [address]    Dear Laith:    [facility] wants to ensure your discharge home is safe and you or your loved ones have had all of your questions answered regarding your care after you leave the hospital.    Below is a list of resources and contact information should you have any questions regarding your hospital stay, follow-up instructions, or active medical symptoms.    Questions or Concerns Regarding… Contact   Medical Questions Related to Your Discharge  (7 days a week, 8am-5pm) Contact a Nurse Care Coordinator   [phone]   Medical Questions Not Related to Your Discharge  (24 hours a day / 7 days a week)  Contact the Nurse Health Line   [phone]    Medications or Discharge Instructions Refer to your discharge packet   or contact your [facility] Primary Care Provider   [phone]   Follow-up Appointment(s) Schedule your appointment via [facility]   or contact Scheduling [phone]   Billing Review your statement via [facility]  or contact Billing [phone]   Medical Records Review your records via [facility]   or contact Medical Records [phone]     You may receive a telephone call within two days of discharge. This call is to make certain you understand your discharge instructions and have the opportunity to have any questions answered. You can also easily access your medical information, test results and upcoming appointments via the [facility] free online health management tool. You can learn more and sign up at [employer]Touch/[employer]ior. For assistance setting up your [facility] account, please call [phone].    Once again, we want to ensure your discharge home is safe and that you have a clear understanding of any next steps in your care. If you have any questions or concerns, please do not hesitate to contact us, we are here for you. Thank you for choosing [facility] for your healthcare needs.    Sincerely,    Your [facility] Healthcare Team

## 2017-07-24 NOTE — PROGRESS NOTES
Pt arrived to floor. Assessment done. VSS. Pt oriented to floor. Admit done. Cardiologist saw pt in room. No complaints of pain. Oxygen on. Bed in low position, wheels locked, side rails up x2, call light within reach.

## 2017-07-24 NOTE — IP AVS SNAPSHOT
Home Care Instructions                                                                                                                  Name:Laith Moon  Medical Record Number:[id number]  CSN: [id number]    YOB: 1960   Age: 57 y.o.  Sex: male  HT:1.829 m (6') WT: 167 kg (368 lb 2.7 oz)          Admit Date: 7/24/2017     Discharge Date:   Today's Date: 7/26/2017  Attending Doctor:  Brad Monae M.D.                  Allergies:  Review of patient's allergies indicates no known allergies.            Discharge Instructions       Discharge Instructions    Discharged to home by car with relative. Discharged via walking, hospital escort: Yes.  Special equipment needed: Not Applicable    Be sure to schedule a follow-up appointment with your primary care doctor or any specialists as instructed.     Discharge Plan: Follow up with Primary Care within a week.     Smoking Cessation Offered: Patient Refused  Influenza Vaccine Indication: Patient Refuses    I understand that a diet low in cholesterol, fat, and sodium is recommended for good health. Unless I have been given specific instructions below for another diet, I accept this instruction as my diet prescription.   Other diet:     Special Instructions: None    · Is patient discharged on Warfarin / Coumadin?   No     · Is patient Post Blood Transfusion?  No    Depression / Suicide Risk    As you are discharged from this [facility] facility, it is important to learn how to keep safe from harming yourself.    Recognize the warning signs:  · Abrupt changes in personality, positive or negative- including increase in energy   · Giving away possessions  · Change in eating patterns- significant weight changes-  positive or negative  · Change in sleeping patterns- unable to sleep or sleeping all the time   · Unwillingness or inability to communicate  · Depression  · Unusual sadness, discouragement and loneliness  · Talk of wanting to die  · Neglect of personal  appearance   · Rebelliousness- reckless behavior  · Withdrawal from people/activities they love  · Confusion- inability to concentrate     If you or a loved one observes any of these behaviors or has concerns about self-harm, here's what you can do:  · Talk about it- your feelings and reasons for harming yourself  · Remove any means that you might use to hurt yourself (examples: pills, rope, extension cords, firearm)  · Get professional help from the community (Mental Health, Substance Abuse, psychological counseling)  · Do not be alone:Call your Safe Contact- someone whom you trust who will be there for you.  · Call your local CRISIS HOTLINE 886-6297 or 159-005-6945  · Call your local Children's Mobile Crisis Response Team [place] (260) 130-1387 or www.Spireon  · Call the toll free National Suicide Prevention Hotlines   · National Suicide Prevention Lifeline 797-487-PCTH (5556)  · [employer] [facility] Network 800-SUICIDE (451-7264)    Atrial Fibrillation  Atrial fibrillation is a condition that causes your heart to beat irregularly. It may also cause your heart to beat faster than normal. Atrial fibrillation can prevent your heart from pumping blood normally. It increases your risk of stroke and heart problems.  HOME CARE  · Take medications as told by your doctor.  · Only take medications that your doctor says are safe. Some medications can make the condition worse or happen again.  · If blood thinners were prescribed by your doctor, take them exactly as told. Too much can cause bleeding. Too little and you will not have the needed protection against stroke and other problems.  · Perform blood tests at home if told by your doctor.  · Perform blood tests exactly as told by your doctor.  · Do not drink alcohol.  · Do not drink beverages with caffeine such as coffee, soda, and some teas.  · Maintain a healthy weight.  · Do not use diet pills unless your doctor says they are safe. They may make heart  problems worse.  · Follow diet instructions as told by your doctor.  · Exercise regularly as told by your doctor.  · Keep all follow-up appointments.  GET HELP IF:  · You notice a change in the speed, rhythm, or strength of your heartbeat.  · You suddenly begin peeing (urinating) more often.  · You get tired more easily when moving or exercising.  GET HELP RIGHT AWAY IF:   · You have chest or belly (abdominal) pain.  · You feel sick to your stomach (nauseous).  · You are short of breath.  · You suddenly have swollen feet and ankles.  · You feel dizzy.  · You face, arms, or legs feel numb or weak.  · There is a change in your vision or speech.  MAKE SURE YOU:   · Understand these instructions.  · Will watch your condition.  · Will get help right away if you are not doing well or get worse.     This information is not intended to replace advice given to you by your health care provider. Make sure you discuss any questions you have with your health care provider.     Document Released: 09/26/2009 Document Revised: 01/08/2016 Document Reviewed: 04/13/2016  [employer] Interactive Patient Education ©2016 [employer]    Sleep Apnea  Sleep apnea is disorder that affects a person's sleep. A person with sleep apnea has abnormal pauses in their breathing when they sleep. It is hard for them to get a good sleep. This makes a person tired during the day. It also can lead to other physical problems. There are three types of sleep apnea. One type is when breathing stops for a short time because your airway is blocked (obstructive sleep apnea). Another type is when the brain sometimes fails to give the normal signal to breathe to the muscles that control your breathing (central sleep apnea). The third type is a combination of the other two types.  HOME CARE  · Do not sleep on your back. Try to sleep on your side.  · Take all medicine as told by your doctor.  · Avoid alcohol, calming medicines (sedatives), and depressant  drugs.  · Try to lose weight if you are overweight. Talk to your doctor about a healthy weight goal.  Your doctor may have you use a device that helps to open your airway. It can help you get the air that you need. It is called a positive airway pressure (PAP) device. There are three types of PAP devices:  · Continuous positive airway pressure (CPAP) device.  · Nasal expiratory positive airway pressure (EPAP) device.  · Bilevel positive airway pressure (BPAP) device.  MAKE SURE YOU:  · Understand these instructions.  · Will watch your condition.  · Will get help right away if you are not doing well or get worse.     This information is not intended to replace advice given to you by your health care provider. Make sure you discuss any questions you have with your health care provider.     Document Released: 09/26/2009 Document Revised: 01/08/2016 Document Reviewed: 04/20/2013  [employer] Interactive Patient Education ©2016 [employer].      Rivaroxaban oral tablets  What is this medicine?  RIVAROXABAN (ri va JERSEY a ban) is an anticoagulant (blood thinner). It is used to treat blood clots in the lungs or in the veins. It is also used after knee or hip surgeries to prevent blood clots. It is also used to lower the chance of stroke in people with a medical condition called atrial fibrillation.  This medicine may be used for other purposes; ask your health care provider or pharmacist if you have questions.  COMMON BRAND NAME(S): Xarelto  What should I tell my health care provider before I take this medicine?  They need to know if you have any of these conditions:  -bleeding disorders  -bleeding in the brain  -blood in your stools (black or tarry stools) or if you have blood in your vomit  -history of stomach bleeding  -kidney disease  -liver disease  -low blood counts, like low white cell, platelet, or red cell counts  -recent or planned spinal or epidural procedure  -take medicines that treat or prevent blood clots  -an  unusual or allergic reaction to rivaroxaban, other medicines, foods, dyes, or preservatives  -pregnant or trying to get pregnant  -breast-feeding  How should I use this medicine?  Take this medicine by mouth with a glass of water. Follow the directions on the prescription label. Take your medicine at regular intervals. Do not take it more often than directed. Do not stop taking except on your doctor's advice.  If you are taking this medicine after hip or knee replacement surgery, take it with or without food.  If you are taking this medicine for atrial fibrillation, take it with your evening meal. If you are taking this medicine to treat blood clots, take it with food at the same time each day. If you are unable to swallow your tablet, you may crush the tablet and mix it in applesauce. Then, immediately eat the applesauce. You should eat more food right after you eat the applesauce containing the crushed tablet.  Talk to your pediatrician regarding the use of this medicine in children. Special care may be needed.  Overdosage: If you think you've taken too much of this medicine contact a poison control center or emergency room at once.  Overdosage: If you think you have taken too much of this medicine contact a poison control center or emergency room at once.  NOTE: This medicine is only for you. Do not share this medicine with others.  What if I miss a dose?  If you take your medicine once a day and miss a dose, take the missed dose as soon as you remember. If you take your medicine twice a day and miss a dose, take the missed dose immediately. In this instance, 2 tablets may be taken at the same time. The next day you should take 1 tablet twice a day as directed.  What may interact with this medicine?  -aspirin and aspirin-like medicines  -certain antibiotics like erythromycin, azithromycin, and clarithromycin  -certain medicines for fungal infections like ketoconazole and itraconazole  -certain medicines for  irregular heart beat like amiodarone, quinidine, dronedarone  -certain medicines for seizures like carbamazepine, phenytoin  -certain medicines that treat or prevent blood clots like warfarin, enoxaparin, and dalteparin   -conivaptan  -diltiazem  -felodipine  -indinavir  -lopinavir; ritonavir  -NSAIDS, medicines for pain and inflammation, like ibuprofen or naproxen  -ranolazine  -rifampin  -ritonavir  -Juan's wort  -verapamil  This list may not describe all possible interactions. Give your health care provider a list of all the medicines, herbs, non-prescription drugs, or dietary supplements you use. Also tell them if you smoke, drink alcohol, or use illegal drugs. Some items may interact with your medicine.  What should I watch for while using this medicine?  Do not stop taking this medicine without first talking to your doctor. Stopping this medicine may increase your risk of having a stroke. Be sure to refill your prescription before you run out of medicine.  This medicine may increase your risk to bruise or bleed. Call your doctor or health care professional if you notice any unusual bleeding.  Be careful brushing and flossing your teeth or using a toothpick because you may bleed more easily. If you have any dental work done, tell your dentist you are receiving this medicine.  What side effects may I notice from receiving this medicine?  Side effects that you should report to your doctor or health care professional as soon as possible:  -allergic reactions like skin rash, itching or hives, swelling of the face, lips, or tongue  -back pain  -bloody or black, tarry stools  -changes in vision  -confusion, trouble speaking or understanding  -red or dark-brown urine  -redness, blistering, peeling or loosening of the skin, including inside the mouth  -severe headaches  -spitting up blood or brown material that looks like coffee grounds  -sudden numbness or weakness of the face, arm or leg  -trouble walking,  dizziness, loss of balance or coordination  -unusual bruising or bleeding from the eye, gums, or nose   Side effects that usually do not require medical attention (Report these to your doctor or health care professional if they continue or are bothersome.):  -dizziness  -muscle pain  This list may not describe all possible side effects. Call your doctor for medical advice about side effects. You may report side effects to FDA at 2-146-GKD-5391.  Where should I keep my medicine?  Keep out of the reach of children.  Store at room temperature between 15 and 30 degrees C (59 and 86 degrees F). Throw away any unused medicine after the expiration date.  NOTE: This sheet is a summary. It may not cover all possible information. If you have questions about this medicine, talk to your doctor, pharmacist, or health care provider.  © 2014, Elsevier/Gold Standard. (3/17/2014 3:32:09 PM)         Discharge Medication Instructions:    Below are the medications your physician expects you to take upon discharge:    Review all your home medications and newly ordered medications with your doctor and/or pharmacist. Follow medication instructions as directed by your doctor and/or pharmacist.    Please keep your medication list with you and share with your physician.               Medication List      START taking these medications        Instructions    Morning Afternoon Evening Bedtime    bumetanide 0.5 MG Tabs   Last time this was given:  0.5 mg on 7/26/2017 12:31 PM   Commonly known as:  BUMEX   Next Dose Due:  7/27/2017        Take 1 Tab by mouth every day.   Dose:  0.5 mg                        magnesium oxide 400 (241.3 MG) MG Tabs tablet   Last time this was given:  400 mg on 7/26/2017  8:33 AM   Commonly known as:  MAG-OX   Next Dose Due:  7/26/2017        Take 1 Tab by mouth 2 Times a Day.   Dose:  400 mg                        * metoprolol 25 MG Tabs   Last time this was given:  50 mg on 7/26/2017  8:33 AM   Commonly known as:   LOPRESSOR   Next Dose Due:  7/26/2017        Take 1 Tab by mouth ONE-HALF HOUR AFTER DINNER.   Dose:  25 mg                        * metoprolol 50 MG Tabs   Start taking on:  7/27/2017   Last time this was given:  50 mg on 7/26/2017  8:33 AM   Commonly known as:  LOPRESSOR   Next Dose Due:  7/27/2017        Take 1 Tab by mouth ONE-HALF HOUR AFTER BREAKFAST.   Dose:  50 mg                        rivaroxaban 20 MG Tabs tablet   Last time this was given:  20 mg on 7/26/2017  8:34 AM   Commonly known as:  XARELTO   Next Dose Due:  7/27/2017        Take 1 Tab by mouth every day.   Dose:  20 mg                        * Notice:  This list has 2 medication(s) that are the same as other medications prescribed for you. Read the directions carefully, and ask your doctor or other care provider to review them with you.      CONTINUE taking these medications        Instructions    Morning Afternoon Evening Bedtime    losartan 50 MG Tabs   Last time this was given:  50 mg on 7/26/2017  8:33 AM   Commonly known as:  COZAAR   Next Dose Due:  7/27/2017        Take 50 mg by mouth every day.   Dose:  50 mg                        metformin 500 MG Tabs   Last time this was given:  500 mg on 7/26/2017  8:33 AM   Commonly known as:  GLUCOPHAGE   Next Dose Due:  7/26/2017        Take 500 mg by mouth 2 times a day, with meals.   Dose:  500 mg                        omeprazole 20 MG delayed-release capsule   Last time this was given:  20 mg on 7/26/2017  8:33 AM   Commonly known as:  PRILOSEC   Next Dose Due:  7/27/2017        Take 20 mg by mouth every day.   Dose:  20 mg                          STOP taking these medications     hydrochlorothiazide 25 MG Tabs   Commonly known as:  HYDRODIURIL               ibuprofen 200 MG Tabs   Commonly known as:  MOTRIN                    Where to Get Your Medications      Information about where to get these medications is not yet available     ! Ask your nurse or doctor about these medications    -  bumetanide 0.5 MG Tabs  - magnesium oxide 400 (241.3 MG) MG Tabs tablet  - metoprolol 25 MG Tabs  - metoprolol 50 MG Tabs  - rivaroxaban 20 MG Tabs tablet            Instructions           Diet / Nutrition:    Follow any diet instructions given to you by your doctor or the dietician, including how much salt (sodium) you are allowed each day.    If you are overweight, talk to your doctor about a weight reduction plan.    Activity:    Remain physically active following your doctor's instructions about exercise and activity.    Rest often.     Any time you become even a little tired or short of breath, SIT DOWN and rest.    Worsening Symptoms:    Report any of the following signs and symptoms to the doctor's office immediately:    *Pain of jaw, arm, or neck  *Chest pain not relieved by medication                               *Dizziness or loss of consciousness  *Difficulty breathing even when at rest   *More tired than usual                                       *Bleeding drainage or swelling of surgical site  *Swelling of feet, ankles, legs or stomach                 *Fever (>100ºF)  *Pink or blood tinged sputum  *Weight gain (3lbs/day or 5lbs /week)           *Shock from internal defibrillator (if applicable)  *Palpitations or irregular heartbeats                *Cool and/or numb extremities    Stroke Awareness    Common Risk Factors for Stroke include:    Age  Atrial Fibrillation  Carotid Artery Stenosis  Diabetes Mellitus  Excessive alcohol consumption  High blood pressure  Overweight   Physical inactivity  Smoking    Warning signs and symptoms of a stroke include:    *Sudden numbness or weakness of the face, arm or leg (especially on one side of the body).  *Sudden confusion, trouble speaking or understanding.  *Sudden trouble seeing in one or both eyes.  *Sudden trouble walking, dizziness, loss of balance or coordination.Sudden severe headache with no known cause.    It is very important to get treatment quickly  when a stroke occurs. If you experience any of the above warning signs, call 911 immediately.                   Disclaimer         Quit Smoking / Tobacco Use:    I understand the use of any tobacco products increases my chance of suffering from future heart disease or stroke and could cause other illnesses which may shorten my life. Quitting the use of tobacco products is the single most important thing I can do to improve my health. For further information on smoking / tobacco cessation call a Toll Free Quit Line at 1-650.244.3051 (*[employer] [place]) or 1-914.171.7564 (American Lung Association) or you can access the web based program at www.lungusa.org.    [place] Tobacco Users Help Line:  (848) 286-9498       Toll Free: 1-129.295.2633  Quit Tobacco Program [facility] (682)509-0965    Crisis Hotline:    [place] Crisis Hotline:  6-476-HUATHRK or 1-312.354.1603    [place] Crisis Hotline:    1-136.605.8030 or 423-176-2262    Discharge Survey:   Thank you for choosing [facility]. We hope we did everything we could to make your hospital stay a pleasant one. You may be receiving a phone survey and we would appreciate your time and participation in answering the questions. Your input is very valuable to us in our efforts to improve our service to our patients and their families.        My signature on this form indicates that:    1. I have reviewed and understand the above information.  2. My questions regarding this information have been answered to my satisfaction.  3. I have formulated a plan with my discharge nurse to obtain my prescribed medications for home.                  Disclaimer         __________________________________                     __________       ________                       Patient Signature                                                 Date                    Time

## 2017-07-24 NOTE — IP AVS SNAPSHOT
" <p align=\"LEFT\"><IMG SRC=\"[email or web address]\" alt=\"Image\" WIDTH=\"50%\" HEIGHT=\"200\" BORDER=\"\"></p>                   Name:Laith Moon  Medical Record Number:[id number]  CSN: [id number]    YOB: 1960   Age: 57 y.o.  Sex: male  HT:1.829 m (6') WT: 167 kg (368 lb 2.7 oz)          Admit Date: 7/24/2017     Discharge Date:   Today's Date: 7/26/2017  Attending Doctor:  Brad Monae M.D.                  Allergies:  Review of patient's allergies indicates no known allergies.             Medication List      Take these Medications        Instructions    bumetanide 0.5 MG Tabs   Commonly known as:  BUMEX    Take 1 Tab by mouth every day.   Dose:  0.5 mg       losartan 50 MG Tabs   Commonly known as:  COZAAR    Take 50 mg by mouth every day.   Dose:  50 mg       magnesium oxide 400 (241.3 MG) MG Tabs tablet   Commonly known as:  MAG-OX    Take 1 Tab by mouth 2 Times a Day.   Dose:  400 mg       metformin 500 MG Tabs   Commonly known as:  GLUCOPHAGE    Take 500 mg by mouth 2 times a day, with meals.   Dose:  500 mg       * metoprolol 25 MG Tabs   Commonly known as:  LOPRESSOR    Take 1 Tab by mouth ONE-HALF HOUR AFTER DINNER.   Dose:  25 mg       * metoprolol 50 MG Tabs   Start taking on:  7/27/2017   Commonly known as:  LOPRESSOR    Take 1 Tab by mouth ONE-HALF HOUR AFTER BREAKFAST.   Dose:  50 mg       omeprazole 20 MG delayed-release capsule   Commonly known as:  PRILOSEC    Take 20 mg by mouth every day.   Dose:  20 mg       rivaroxaban 20 MG Tabs tablet   Commonly known as:  XARELTO    Take 1 Tab by mouth every day.   Dose:  20 mg       * Notice:  This list has 2 medication(s) that are the same as other medications prescribed for you. Read the directions carefully, and ask your doctor or other care provider to review them with you.      "

## 2017-07-25 LAB
AMMONIA PLAS-SCNC: 37 UMOL/L (ref 11–45)
ANION GAP SERPL CALC-SCNC: 8 MMOL/L (ref 0–11.9)
BUN SERPL-MCNC: 23 MG/DL (ref 8–22)
CALCIUM SERPL-MCNC: 8.7 MG/DL (ref 8.5–10.5)
CHLORIDE SERPL-SCNC: 101 MMOL/L (ref 96–112)
CHOLEST SERPL-MCNC: 114 MG/DL (ref 100–199)
CO2 SERPL-SCNC: 28 MMOL/L (ref 20–33)
CREAT SERPL-MCNC: 1.24 MG/DL (ref 0.5–1.4)
EKG IMPRESSION: NORMAL
ERYTHROCYTE [DISTWIDTH] IN BLOOD BY AUTOMATED COUNT: 48.6 FL (ref 35.9–50)
GFR SERPL CREATININE-BSD FRML MDRD: >60 ML/MIN/1.73 M 2
GLUCOSE BLD-MCNC: 131 MG/DL (ref 65–99)
GLUCOSE BLD-MCNC: 132 MG/DL (ref 65–99)
GLUCOSE BLD-MCNC: 140 MG/DL (ref 65–99)
GLUCOSE BLD-MCNC: 94 MG/DL (ref 65–99)
GLUCOSE SERPL-MCNC: 125 MG/DL (ref 65–99)
HCT VFR BLD AUTO: 36.1 % (ref 42–52)
HDLC SERPL-MCNC: 32 MG/DL
HGB BLD-MCNC: 11.4 G/DL (ref 14–18)
LDLC SERPL CALC-MCNC: 62 MG/DL
MAGNESIUM SERPL-MCNC: 1.6 MG/DL (ref 1.5–2.5)
MCH RBC QN AUTO: 30.6 PG (ref 27–33)
MCHC RBC AUTO-ENTMCNC: 31.6 G/DL (ref 33.7–35.3)
MCV RBC AUTO: 96.8 FL (ref 81.4–97.8)
PHOSPHATE SERPL-MCNC: 4.7 MG/DL (ref 2.5–4.5)
PLATELET # BLD AUTO: 135 K/UL (ref 164–446)
PMV BLD AUTO: 11.4 FL (ref 9–12.9)
POTASSIUM SERPL-SCNC: 4 MMOL/L (ref 3.6–5.5)
RBC # BLD AUTO: 3.73 M/UL (ref 4.7–6.1)
SODIUM SERPL-SCNC: 137 MMOL/L (ref 135–145)
TRIGL SERPL-MCNC: 102 MG/DL (ref 0–149)
TROPONIN I SERPL-MCNC: 0.04 NG/ML (ref 0–0.04)
WBC # BLD AUTO: 7 K/UL (ref 4.8–10.8)

## 2017-07-25 PROCEDURE — A9270 NON-COVERED ITEM OR SERVICE: HCPCS | Performed by: STUDENT IN AN ORGANIZED HEALTH CARE EDUCATION/TRAINING PROGRAM

## 2017-07-25 PROCEDURE — A9270 NON-COVERED ITEM OR SERVICE: HCPCS | Performed by: INTERNAL MEDICINE

## 2017-07-25 PROCEDURE — 93005 ELECTROCARDIOGRAM TRACING: CPT | Performed by: HOSPITALIST

## 2017-07-25 PROCEDURE — 700111 HCHG RX REV CODE 636 W/ 250 OVERRIDE (IP): Performed by: INTERNAL MEDICINE

## 2017-07-25 PROCEDURE — 93010 ELECTROCARDIOGRAM REPORT: CPT | Performed by: INTERNAL MEDICINE

## 2017-07-25 PROCEDURE — 700102 HCHG RX REV CODE 250 W/ 637 OVERRIDE(OP): Performed by: STUDENT IN AN ORGANIZED HEALTH CARE EDUCATION/TRAINING PROGRAM

## 2017-07-25 PROCEDURE — 94660 CPAP INITIATION&MGMT: CPT

## 2017-07-25 PROCEDURE — 82962 GLUCOSE BLOOD TEST: CPT | Mod: 91

## 2017-07-25 PROCEDURE — 700102 HCHG RX REV CODE 250 W/ 637 OVERRIDE(OP): Performed by: HOSPITALIST

## 2017-07-25 PROCEDURE — 99232 SBSQ HOSP IP/OBS MODERATE 35: CPT | Performed by: INTERNAL MEDICINE

## 2017-07-25 PROCEDURE — 770020 HCHG ROOM/CARE - TELE (206)

## 2017-07-25 PROCEDURE — 700102 HCHG RX REV CODE 250 W/ 637 OVERRIDE(OP): Performed by: INTERNAL MEDICINE

## 2017-07-25 PROCEDURE — A9270 NON-COVERED ITEM OR SERVICE: HCPCS | Performed by: HOSPITALIST

## 2017-07-25 RX ORDER — FUROSEMIDE 10 MG/ML
20 INJECTION INTRAMUSCULAR; INTRAVENOUS ONCE
Status: COMPLETED | OUTPATIENT
Start: 2017-07-25 | End: 2017-07-25

## 2017-07-25 RX ORDER — LISINOPRIL 10 MG/1
10 TABLET ORAL
Status: DISCONTINUED | OUTPATIENT
Start: 2017-07-25 | End: 2017-07-26 | Stop reason: HOSPADM

## 2017-07-25 RX ADMIN — THERA TABS 1 TABLET: TAB at 08:24

## 2017-07-25 RX ADMIN — FUROSEMIDE 20 MG: 10 INJECTION, SOLUTION INTRAMUSCULAR; INTRAVENOUS at 12:34

## 2017-07-25 RX ADMIN — LOSARTAN POTASSIUM 50 MG: 50 TABLET, FILM COATED ORAL at 08:24

## 2017-07-25 RX ADMIN — Medication 100 MG: at 08:24

## 2017-07-25 RX ADMIN — MAGNESIUM GLUCONATE 500 MG ORAL TABLET 400 MG: 500 TABLET ORAL at 08:24

## 2017-07-25 RX ADMIN — POLYETHYLENE GLYCOL 3350 1 PACKET: 17 POWDER, FOR SOLUTION ORAL at 21:08

## 2017-07-25 RX ADMIN — LISINOPRIL 10 MG: 10 TABLET ORAL at 10:44

## 2017-07-25 RX ADMIN — OMEPRAZOLE 20 MG: 20 CAPSULE, DELAYED RELEASE ORAL at 08:24

## 2017-07-25 RX ADMIN — RIVAROXABAN 20 MG: 20 TABLET, FILM COATED ORAL at 08:24

## 2017-07-25 RX ADMIN — METOPROLOL TARTRATE 50 MG: 50 TABLET, FILM COATED ORAL at 08:24

## 2017-07-25 RX ADMIN — ACETAMINOPHEN 650 MG: 325 TABLET, FILM COATED ORAL at 21:07

## 2017-07-25 RX ADMIN — STANDARDIZED SENNA CONCENTRATE AND DOCUSATE SODIUM 2 TABLET: 8.6; 5 TABLET, FILM COATED ORAL at 21:07

## 2017-07-25 RX ADMIN — METFORMIN HYDROCHLORIDE 500 MG: 500 TABLET, FILM COATED ORAL at 08:24

## 2017-07-25 RX ADMIN — CHLORDIAZEPOXIDE HYDROCHLORIDE 50 MG: 25 CAPSULE ORAL at 08:24

## 2017-07-25 RX ADMIN — MAGNESIUM GLUCONATE 500 MG ORAL TABLET 400 MG: 500 TABLET ORAL at 21:07

## 2017-07-25 RX ADMIN — METFORMIN HYDROCHLORIDE 500 MG: 500 TABLET, FILM COATED ORAL at 17:36

## 2017-07-25 RX ADMIN — FOLIC ACID 1 MG: 1 TABLET ORAL at 08:24

## 2017-07-25 RX ADMIN — METOPROLOL TARTRATE 50 MG: 50 TABLET, FILM COATED ORAL at 21:07

## 2017-07-25 ASSESSMENT — LIFESTYLE VARIABLES
ORIENTATION AND CLOUDING OF SENSORIUM: ORIENTED AND CAN DO SERIAL ADDITIONS
ANXIETY: NO ANXIETY (AT EASE)
AUDITORY DISTURBANCES: NOT PRESENT
VISUAL DISTURBANCES: NOT PRESENT
ORIENTATION AND CLOUDING OF SENSORIUM: ORIENTED AND CAN DO SERIAL ADDITIONS
TOTAL SCORE: 0
NAUSEA AND VOMITING: NO NAUSEA AND NO VOMITING
PAROXYSMAL SWEATS: BARELY PERCEPTIBLE SWEATING. PALMS MOIST
TOTAL SCORE: 0
HEADACHE, FULLNESS IN HEAD: NOT PRESENT
TOTAL SCORE: 1
TREMOR: NO TREMOR
TREMOR: NO TREMOR
AGITATION: NORMAL ACTIVITY
ORIENTATION AND CLOUDING OF SENSORIUM: ORIENTED AND CAN DO SERIAL ADDITIONS
AGITATION: NORMAL ACTIVITY
PAROXYSMAL SWEATS: NO SWEAT VISIBLE
ANXIETY: NO ANXIETY (AT EASE)
VISUAL DISTURBANCES: NOT PRESENT
AGITATION: NORMAL ACTIVITY
NAUSEA AND VOMITING: NO NAUSEA AND NO VOMITING
ANXIETY: NO ANXIETY (AT EASE)
AUDITORY DISTURBANCES: NOT PRESENT
NAUSEA AND VOMITING: NO NAUSEA AND NO VOMITING
HEADACHE, FULLNESS IN HEAD: NOT PRESENT
NAUSEA AND VOMITING: NO NAUSEA AND NO VOMITING
AUDITORY DISTURBANCES: NOT PRESENT
HEADACHE, FULLNESS IN HEAD: NOT PRESENT
HEADACHE, FULLNESS IN HEAD: NOT PRESENT
PAROXYSMAL SWEATS: NO SWEAT VISIBLE
TREMOR: NO TREMOR
ANXIETY: NO ANXIETY (AT EASE)
TOTAL SCORE: 0
AGITATION: NORMAL ACTIVITY
VISUAL DISTURBANCES: NOT PRESENT
ORIENTATION AND CLOUDING OF SENSORIUM: ORIENTED AND CAN DO SERIAL ADDITIONS
AUDITORY DISTURBANCES: NOT PRESENT
VISUAL DISTURBANCES: NOT PRESENT
TREMOR: NO TREMOR
PAROXYSMAL SWEATS: NO SWEAT VISIBLE

## 2017-07-25 ASSESSMENT — ENCOUNTER SYMPTOMS
COUGH: 0
BLURRED VISION: 0
HEARTBURN: 0
SHORTNESS OF BREATH: 0
FEVER: 0
BLOOD IN STOOL: 0
BRUISES/BLEEDS EASILY: 0
MYALGIAS: 0
HEADACHES: 0
DIZZINESS: 0
PALPITATIONS: 0

## 2017-07-25 ASSESSMENT — PAIN SCALES - GENERAL
PAINLEVEL_OUTOF10: 0

## 2017-07-25 NOTE — ASSESSMENT & PLAN NOTE
-accus with sliding scale coverage  -diabetic diet  -diabetic education  -follow glycohemoglobin levels long term  -continue with oral antihyperglycemics  -monitor for hypoglycemic episodes and adjust control if he should get low

## 2017-07-25 NOTE — PROGRESS NOTES
Pt noted to have a 3 sec pause on telemetry, asleep supine w/ HOB elevated, noted sleep apnea, aroused easily, turned to right side, a fib remains.

## 2017-07-25 NOTE — PROGRESS NOTES
Pt sleeping most of the day. Pt difficult to arouse at times tends to be startled when waking him up. Pt awaiting family to come visit at sometime today. Pt has no signs of detox at this point, continuing CIWA as needed. Will ask hospitalist when they round about discontinuing it. No complaints of pain or discomfort. Medicating pt as needed for HR.

## 2017-07-25 NOTE — CONSULTS
Pulmonary Consultation Note    Name:Laith Moon  MRN:[id number]  Date of Service: 7/25/17  Referring physician: Brad Monae M.D.    Chief Complaint: Evaluation for MELISSA    History of Present Illness:  This is a pleasant 56-yo M, hx of DM, HTN, OA, who is from [place] and was vacationing in [place] where he complained of worsening SOB and EMS called. Upon arrival, noted to be hypoxic and in AFib. Patient states his respiratory status has been worsening over last 2 or so months. This is described as IZAGUIRRE after 1 block, orthopnea. Denies chest pain, cough, night sweats. Notes daytime somnolence, gets sleepy when driving so has to take frequent breaks, nodding off at work, taking afternoon naps, falling asleep while watching TV. No morning headaches. Has witnessed snoring, apneic events, startled awakenings. No syncopal events.  While inpatient, was noted to have nocturnal bradycardia, as low as 31 bpm. We are asked for evaluation and treatment of presumed MELISSA.    Past Medical History:  DM  OA  HTN    Past Surgical History:  Shoulder, L knee    Allergies:  Review of patient's allergies indicates no known allergies.    Medications:  As per EMR    Social History:   Daily MJ use. 10+ shots of tequila/ day, remote smoking hx    Family History:  Non-contributory    ROS:  As per HPI The rest of ROS are negative per AMA and CMMS guidelines.    Physical Examination:  Physical Exam   Constitutional: He is oriented to person, place, and time. He appears well-developed and well-nourished.   obese   HENT:   Head: Normocephalic and atraumatic.   Right Ear: External ear normal.   Eyes: Conjunctivae are normal. Pupils are equal, round, and reactive to light. Right eye exhibits no discharge.   Neck: Normal range of motion. Neck supple. No tracheal deviation present. No thyromegaly present.   Large neck circumference, Mallampati 3   Cardiovascular:   Irregular rhythm, rate controlled   Pulmonary/Chest: Effort normal and  breath sounds normal. No respiratory distress. He has no wheezes. He has no rales.   Abdominal: Soft. Bowel sounds are normal. He exhibits no distension. There is no tenderness.   Musculoskeletal: Normal range of motion. Edema: trace pitting edema of lower extremities    Neurological: He is alert and oriented to person, place, and time. No cranial nerve deficit. Coordination normal.   Skin: Skin is warm and dry.       Laboratories:  Recent Labs      07/24/17   0900  07/24/17   2353   WBC  7.6  7.0   RBC  4.03*  3.73*   HEMOGLOBIN  12.5*  11.4*   HEMATOCRIT  38.6*  36.1*   MCV  95.8  96.8   MCH  31.0  30.6   RDW  47.7  48.6   PLATELETCT  148*  135*   MPV  11.8  11.4   NEUTSPOLYS  70.80   --    LYMPHOCYTES  15.50*   --    MONOCYTES  9.80   --    EOSINOPHILS  2.40   --    BASOPHILS  1.20   --      Recent Labs      07/24/17   0900  07/24/17   2352   SODIUM  138  137   POTASSIUM  3.7  4.0   CHLORIDE  102  101   CO2  25  28   GLUCOSE  109*  125*   BUN  22  23*         Images:  CT chest PE protocol: 7/24/17  Images reviewed by me. There is bibasilar atelectasis, with a small right pleural effusion . Parenchyma appears normal with exception of septal thickening. There are enlarged mediastinal LN. Multiple sub-cm nodules in right lung zones.     TTE 7/24/17  No prior study is available for comparison.   Normal left ventricular chamber size.  Mild concentric left ventricular hypertrophy.  Left ventricular ejection fraction is visually estimated to be 35%.  Trace mitral regurgitation.  Trace tricuspid regurgitation.  Normal pericardium without effusion.  Ascending aorta diameter is 3.3 cm.    Impression:  Active Hospital Problems    Diagnosis   • Atrial fibrillation, new onset (CMS-HCC) [I48.91]     Priority: High   • Morbid obesity (CMS-HCC) [E66.01]     Priority: High   • Alcohol abuse [F10.10]     Priority: High   • Obstructive sleep apnea [G47.33]     Priority: High   • Hypertension [I10]     Priority: Medium   •  Diabetes mellitus type II, uncontrolled (CMS-HCC) [E11.65]     Priority: Medium   • Gastroesophageal reflux disease [K21.9]     Priority: Medium   • Hypomagnesemia [E83.42]     Priority: Medium   • Coagulopathy (CMS-HCC) [D68.9]     Priority: Medium   • Normochromic normocytic anemia [D64.9]     Priority: Low   • Thrombocytopenia (CMS-HCC) [D69.6]     Priority: Low     Hypoxic respiratory failure-   Currently on 2-3 LPM of supplemental O2. CT chest c/w pulmonary edema/ volume overload.  - HFrEF, AFib, currently rate controlled  - lasix 20mg IV now x 1, monitor response  - cardiology managing HF medications  - HTN controlled    High-risk for MELISSA  - nocturnal events of bradycardia  - has been told in the past to get evaluated, did not go  - Start nocturnal Auto-PAP, will d/w with RT- likely needs transfer to Unit for initiation  - Encourage weight loss  - avoid sedatives, especially alcohol, benzos, etc.     Tian Garcia MD  [facility]

## 2017-07-25 NOTE — CARE PLAN
Problem: Safety  Goal: Will remain free from falls  Outcome: PROGRESSING AS EXPECTED  Bed locked in low position, call light in reach, treaded socks on.    Problem: Knowledge Deficit  Goal: Knowledge of disease process/condition, treatment plan, diagnostic tests, and medications will improve  Outcome: PROGRESSING AS EXPECTED  POC discussed, pt verbalizes understanding.

## 2017-07-25 NOTE — ASSESSMENT & PLAN NOTE
Secondary to his alcohol abuse, his liver functions at this point are not elevated however his coagulation factors are not being produced adequately and thus he has a slight elevation in his INR.

## 2017-07-25 NOTE — PROGRESS NOTES
Of note, pt had gotten up to the BR to void earlier and his HR shot up into the 150s,  Currently he is sleeping w/ periods of Sleep apnea and his HR is decreasing to the 30s at times.

## 2017-07-25 NOTE — PROGRESS NOTES
Cardiology Progress Note               Author: Aneesh Farr Date & Time created: 2017  10:05 AM     Interval History:  Admitted yesterday with IZAGUIRRE and rapid atrial fib.  Echo shows severely reduced LV function.  28 beats of NSVT during the night  Patient has not been evaluated for MELISSA, but was hyper somnolent when I tried to wake him up this morning.  Anemia evaluation in progress. No stool smple as of yet.  Anticoag started for atrial fib.    Review of Systems   Constitutional: Negative for fever.   Respiratory: Negative for shortness of breath.    Cardiovascular: Negative for chest pain and palpitations.   Gastrointestinal: Negative for blood in stool.   Endo/Heme/Allergies: Does not bruise/bleed easily.       Physical Exam   Constitutional: No distress.   Obese   HENT:   Head: Normocephalic and atraumatic.   Eyes: No scleral icterus.   Cardiovascular: An irregularly irregular rhythm present. Tachycardia present.    Abdominal: Soft.   Musculoskeletal: He exhibits no edema.   Neurological: He is alert.   Hyper somnolent when sleeping   Skin: Skin is warm and dry.       Hemodynamics:  Temp (24hrs), Av.6 °C (97.8 °F), Min:36.1 °C (97 °F), Max:37.1 °C (98.8 °F)  Temperature: 36.5 °C (97.7 °F)  Pulse  Av.9  Min: 62  Max: 126Heart Rate (Monitored): (!) 106  Blood Pressure: 138/82 mmHg, NIBP: 139/89 mmHg     Respiratory:    Respiration: 18, Pulse Oximetry: 94 %, O2 Daily Delivery Respiratory : Nasal Cannula        RUL Breath Sounds: Clear, RML Breath Sounds: Clear, RLL Breath Sounds: Diminished, ANNA Breath Sounds: Clear, LLL Breath Sounds: Diminished  Fluids:     Weight: (!) 166.8 kg (367 lb 11.6 oz)  GI/Nutrition:  Orders Placed This Encounter   Procedures   • Diet Order     Standing Status: Standing      Number of Occurrences: 1      Standing Expiration Date:      Order Specific Question:  Diet:     Answer:  Cardiac [6]     Lab Results:  Recent Labs      17   0900  17   2353   WBC  7.6  7.0    RBC  4.03*  3.73*   HEMOGLOBIN  12.5*  11.4*   HEMATOCRIT  38.6*  36.1*   MCV  95.8  96.8   MCH  31.0  30.6   MCHC  32.4*  31.6*   RDW  47.7  48.6   PLATELETCT  148*  135*   MPV  11.8  11.4     Recent Labs      07/24/17   0900  07/24/17   2352   SODIUM  138  137   POTASSIUM  3.7  4.0   CHLORIDE  102  101   CO2  25  28   GLUCOSE  109*  125*   BUN  22  23*   CREATININE  1.12  1.24   CALCIUM  9.2  8.7     Recent Labs      07/24/17   0900   APTT  35.2   INR  1.14*     Recent Labs      07/24/17   0900  07/24/17   1509   BNPBTYPENAT  225*  251*     Recent Labs      07/24/17   0900  07/24/17   1509  07/24/17   1811  07/24/17   2352   TROPONINI  0.03   --   0.05*  0.04   BNPBTYPENAT  225*  251*   --    --      Recent Labs      07/24/17   2352   TRIGLYCERIDE  102   HDL  32*   LDL  62         Medical Decision Making, by Problem:  Active Hospital Problems    Diagnosis   • Atrial fibrillation, new onset (CMS-[facility]) [I48.91]   • Morbid obesity (CMS-[facility]) [E66.01]   • Alcohol abuse [F10.10]   • Obstructive sleep apnea [G47.33]   • Hypertension [I10]   • Diabetes mellitus type II, uncontrolled (CMS-[facility]) [E11.65]   • Gastroesophageal reflux disease [K21.9]   • Hypomagnesemia [E83.42]   • Coagulopathy (CMS-[facility]) [D68.9]   • Normochromic normocytic anemia [D64.9]   • Thrombocytopenia (CMS-[facility]) [D69.6]       Plan:  Heart rate improved on B blocker.  Period of narcisa during the night which may be related to MELISSA.  Needs eval of his sleep apnea in hospital because of bradycardic event  Continue rivaroxaban for the moment. If stool positive for blood, would need GI eval and interruption of anticoagulation.  He may have gastritis from his alcohol abuse.  Start ACE inhib for CHF.  Ultimately change to metoprolol succinate for guideline consistent CHF treatment once dose of metoprolol for atrial rate stable.   Core Measures

## 2017-07-25 NOTE — CARE PLAN
Problem: Communication  Goal: The ability to communicate needs accurately and effectively will improve  Outcome: PROGRESSING AS EXPECTED  Questions and concerns addressed. Pt updated on plan for today. Will be waiting for MD to round for pt to get updated.     Problem: Infection  Goal: Will remain free from infection  Outcome: PROGRESSING AS EXPECTED  Pt assessed for signs and symptoms of infection. Labs and vitals assessed for infection. Proper hand hygiene performed prior to entering and exiting pt's room. Pt educated on proper hand hygiene.

## 2017-07-25 NOTE — DIETARY
NUTRITION SERVICES: BMI - Pt with BMI >40 (=49.86). Weight loss counseling not appropriate in acute care setting. RECOMMEND - Referral to outpatient nutrition services for weight management after D/C.

## 2017-07-25 NOTE — H&P
Hospital Medicine History and Physical    Date of Service  7/24/2017    Chief Complaint  Chief Complaint   Patient presents with   • Shortness of Breath       History of Presenting Illness  57 y.o. male who presented 7/24/2017 with palpitations. The patient is on vacation in [place] from [place]. There over the past 2 days he's become more short of breath. He's also developed palpitations and generalized weakness. Upon evaluation in the emergency room patient at this point was found to be in atrial fibrillation with rapid ventricular response. He will be admitted at this point to the telemetric unit. We will continue with rate control evaluation with an echocardiogram. Will monitor cardiac enzymes and a serial fashion. Cardiology consultation has been requested as well.    Primary Care Physician  No primary care provider on file.    Consultants  Cardiology    Code Status  DNR code status    Review of Systems  ROS  Patient currently complains of generalized weakness, palpitations over the past 2 days, worsening shortness of breath over the past 2 days. Denies headache or fevers or chills denies being dizzy denies any nausea vomiting any chest pain any abdominal pain denies any problems with constipation diarrhea denies any problems urinating denies any headache. All other review of systems were reviewed and are negative.     Past Medical History  Diabetes mellitus type II  Gastroesophageal reflux disease  Hypertension.  Obstructive sleep apnea  Morbid obesity    Surgical History  Left rotator cuff repair.  Carpal tunnel repair on the left.  Left knee arthroscopic surgery.       Medications  No current facility-administered medications on file prior to encounter.     No current outpatient prescriptions on file prior to encounter.       Family History  No family history on file.    Social History  Social History   Substance Use Topics   • Smoking status: Not on file   • Smokeless tobacco: Not on file   •  Alcohol Use: Not on file       Allergies  No Known Allergies     Physical Exam  Laboratory   Hemodynamics  Temp (24hrs), Av.4 °C (97.6 °F), Min:36.1 °C (97 °F), Max:36.9 °C (98.4 °F)   Temperature: 36.1 °C (97 °F)  Pulse  Av.7  Min: 75  Max: 126 Heart Rate (Monitored): (!) 106  Blood Pressure: 129/86 mmHg, NIBP: 139/89 mmHg      Respiratory      Respiration: 16, Pulse Oximetry: 96 %, O2 Daily Delivery Respiratory : Nasal Cannula        RUL Breath Sounds: Clear, RML Breath Sounds: Clear, RLL Breath Sounds: Diminished, ANNA Breath Sounds: Clear, LLL Breath Sounds: Diminished    Physical Exam   Patient is currently alert awake oriented ×3 pleasant cooperative gentleman in no apparent distress. He is morbidly obese. Head is atraumatic. Eyes follow in normal range of gaze. Pupils are equal round and reactive bilaterally, sclerae anicteric conjunctiva is of normal hue.  Nose is midline without discharge no nasal fracture no bleeding from the nose.  Ears bilaterally intact without any discharge, external ear is normal. Ear canals are patent. No mastoid tenderness.  Oral cavity moist mucous membranes no apparent focus of infection in the oral cavity. Neck is soft supple no JVD carotid bruits thyromegaly or lymphadenopathy appreciated.  Chest almost equally to inspiration and expiration upper endoscopy motion no reversible just with tenderness.  Heart S1-S2 tachycardic irregularly irregular no murmurs or gallops detected.  Lungs diminished in all areas auscultated but mostly in the bases bilaterally.  Abdomen is globose secondary to obesity, bowel sounds are distant but present. Hepatomegaly and splenomegaly could not be appreciated due to the girth of the abdomen. Patient has no focal tenderness no rebound.  Genital exam within normal limits.  Upper and lower extremities positive pulses no edema good muscle strength good muscle tone positive deep tendon reflexes.  Skin normal turgor no rashes or abrasions  identified.  Neurologically cranial nerves II-12 grossly within normal limits.    Recent Labs      07/24/17   0900   WBC  7.6   RBC  4.03*   HEMOGLOBIN  12.5*   HEMATOCRIT  38.6*   MCV  95.8   MCH  31.0   MCHC  32.4*   RDW  47.7   PLATELETCT  148*   MPV  11.8     Recent Labs      07/24/17   0900   SODIUM  138   POTASSIUM  3.7   CHLORIDE  102   CO2  25   GLUCOSE  109*   BUN  22   CREATININE  1.12   CALCIUM  9.2     Recent Labs      07/24/17   0900   ALTSGPT  22   ASTSGOT  26   ALKPHOSPHAT  50   TBILIRUBIN  0.8   GLUCOSE  109*     Recent Labs      07/24/17   0900   APTT  35.2   INR  1.14*     Recent Labs      07/24/17   0900 07/24/17   1509   BNPBTYPENAT  225*  251*         Lab Results   Component Value Date    TROPONINI 0.03 07/24/2017       Imaging  Echocardiogram Comp W/O Cont   Final Result      CT-CTA CHEST PULMONARY ARTERY W/ RECONS   Final Result      1.  No evidence of pulmonary embolus.      2.  Bibasilar atelectasis, right greater than left. There is a small right pleural effusion.      3.  Multiple pulmonary nodules within the right upper and lower lobes which measure up to 5 mm in size.      4.  Borderline enlarged mediastinal lymph nodes.      5.  Gallstones.      6.  Fatty liver.      Fleischner Society Guideline (Radiology 237:2005) pulmonary nodule recommendations( >4-6 mm):      For low risk patients, follow-up at 12 months. If no change, no further imaging is needed.   For high risk patients, initial follow-up CT at 6-12 months and then at 18-24 months if no change.      DX-CHEST-PORTABLE (1 VIEW)   Final Result      Cardiomegaly with interstitial pulmonary edema.      ECHOCARDIOGRAM-COMP W/ CONT    (Results Pending)      Assessment/Plan     I anticipate this patient will require at least two midnights for appropriate medical management, necessitating inpatient admission.    Atrial fibrillation, new onset (CMS-HCC) (present on admission)  Assessment & Plan  Patient's atrial fibrillation is most  likely secondary to his obstructive sleep apnea and obesity. The patient at this point will be started on rate control with Cardizem and continue with metoprolol. He'll be anticoagulated with Xarelto.   We will obtain an echocardiogram, serial cardiac markers.  Cardiology consultation has been requested.    Morbid obesity (CMS-HCC) (present on admission)  Assessment & Plan  I have discussed in extensive detail with him regarding weight loss management. The patient will need an outpatient bariatric evaluation and a appointment with the clinic.    Alcohol abuse (present on admission)  Assessment & Plan  He will be placed on the CIWA protocol. The patient was drinking 5 shots of tequila every day for the past 5 years. He says this is to combat his work stress, but he also is a very social person and drinks excessively with his friends. Patient has been counseled on how cold sensation. I will start him on thiamine and folic acid multivitamin. He'll be started on chlordiazepoxide. Will monitor ammonia levels and liver functions.    Obstructive sleep apnea (present on admission)  Assessment & Plan  He will need an official outpatient sleep study. His wife says he snores excessively.    Hypertension (present on admission)  Assessment & Plan  He will be treated with clonidine, losartan and metoprolol.  We will attempt to keep his systolic blood pressure under 140 and his diastolic under 90.  He will be evaluated with an echocardiogram.    Diabetes mellitus type II, uncontrolled (CMS-[facility]) (present on admission)  Assessment & Plan  -accus with sliding scale coverage  -diabetic diet  -diabetic education  -follow glycohemoglobin levels long term  -continue with oral antihyperglycemics  -monitor for hypoglycemic episodes and adjust control if he should get low    Gastroesophageal reflux disease (present on admission)  Assessment & Plan  Continue with PPI.    Hypomagnesemia (present on admission)  Assessment & Plan  His magnesium  is down 1.3 I will replace it with IV magnesium 4 g.    Coagulopathy (CMS-HCC) (present on admission)  Assessment & Plan  Secondary to his alcohol abuse, his liver functions at this point are not elevated however his coagulation factors are not being produced adequately and thus he has a slight elevation in his INR.    Normochromic normocytic anemia (present on admission)  Assessment & Plan  Monitor H&H, transfuse if unstable or below 7 and 21. Most likely secondary to bone marrow suppression.    Thrombocytopenia (CMS-HCC) (present on admission)  Assessment & Plan  Monitor at this point platelet counts, if below 15 transfuse, if bleeding and below 50 transfuse.      VTE prophylaxis: Xarelto .

## 2017-07-25 NOTE — PROGRESS NOTES
.telemetry shift summary ./.10/.  A fib     W/ bursts of SVT to 150s w/ activity, and as slow as 28 related to periods of sleep apnea.

## 2017-07-25 NOTE — PROGRESS NOTES
Pt in and out of vtach 28 beat run, /87 89 20 97% on 3 lpm,  Denies pain, taking sips of water, positioned in a chair position in bed.  Will continue to monitor.  BSugar 132

## 2017-07-25 NOTE — PROGRESS NOTES
Received report and assumed care of patient. Patient is alert and oriented x4. Patient is in no signs of distress and denies pain at this time. Patient was updated on the plan of care for the day. Wanting to talk with MD about a sleep apnea study prior to dc. Call light within reach, bed in low position, 2 side rails up. Educated on fall risk, verbalizes understanding. Will continue to monitor

## 2017-07-25 NOTE — PROGRESS NOTES
2 RN skin check with Mayela  Redness/Rash on abdomen   Calloused/cracked feet  Rest of skin intact throughout.

## 2017-07-25 NOTE — ASSESSMENT & PLAN NOTE
He will be placed on the CIWA protocol. The patient was drinking 5 shots of tequila every day for the past 5 years. He says this is to combat his work stress, but he also is a very social person and drinks excessively with his friends. Patient has been counseled on how cold sensation. I will start him on thiamine and folic acid multivitamin. He'll be started on chlordiazepoxide. Will monitor ammonia levels and liver functions.

## 2017-07-25 NOTE — ASSESSMENT & PLAN NOTE
He will be treated with clonidine, losartan and metoprolol.  We will attempt to keep his systolic blood pressure under 140 and his diastolic under 90.  He will be evaluated with an echocardiogram.

## 2017-07-25 NOTE — ASSESSMENT & PLAN NOTE
Patient's atrial fibrillation is most likely secondary to his obstructive sleep apnea and obesity. The patient at this point will be started on rate control with Cardizem and continue with metoprolol. He'll be anticoagulated with Xarelto.   We will obtain an echocardiogram, serial cardiac markers.  Cardiology consultation has been requested.

## 2017-07-25 NOTE — ASSESSMENT & PLAN NOTE
Monitor H&H, transfuse if unstable or below 7 and 21. Most likely secondary to bone marrow suppression.

## 2017-07-25 NOTE — ASSESSMENT & PLAN NOTE
I have discussed in extensive detail with him regarding weight loss management. The patient will need an outpatient bariatric evaluation and a appointment with the clinic.

## 2017-07-25 NOTE — PROGRESS NOTES
Received bedside report from Leigh WU .  Pt is a/o x 4.  POC discussed w/ pt, verbalizes understanding,  Bed is locked, low, belongings in reach, call light in reach.  Obese, telemetry a fib, O2 on 2 lpm NC, extension tubing in place.  Denies pain apt present.  IVF infusing

## 2017-07-26 VITALS
HEIGHT: 72 IN | SYSTOLIC BLOOD PRESSURE: 114 MMHG | DIASTOLIC BLOOD PRESSURE: 75 MMHG | RESPIRATION RATE: 20 BRPM | BODY MASS INDEX: 42.66 KG/M2 | OXYGEN SATURATION: 93 % | HEART RATE: 92 BPM | TEMPERATURE: 96.9 F | WEIGHT: 315 LBS

## 2017-07-26 PROBLEM — E83.42 HYPOMAGNESEMIA: Status: RESOLVED | Noted: 2017-07-24 | Resolved: 2017-07-26

## 2017-07-26 LAB
AMMONIA PLAS-SCNC: 55 UMOL/L (ref 11–45)
ANION GAP SERPL CALC-SCNC: 6 MMOL/L (ref 0–11.9)
BASOPHILS # BLD AUTO: 0.4 % (ref 0–1.8)
BASOPHILS # BLD: 0.03 K/UL (ref 0–0.12)
BNP SERPL-MCNC: 208 PG/ML (ref 0–100)
BUN SERPL-MCNC: 20 MG/DL (ref 8–22)
CALCIUM SERPL-MCNC: 8.8 MG/DL (ref 8.5–10.5)
CHLORIDE SERPL-SCNC: 103 MMOL/L (ref 96–112)
CO2 SERPL-SCNC: 28 MMOL/L (ref 20–33)
CREAT SERPL-MCNC: 0.85 MG/DL (ref 0.5–1.4)
EKG IMPRESSION: NORMAL
EOSINOPHIL # BLD AUTO: 0.31 K/UL (ref 0–0.51)
EOSINOPHIL NFR BLD: 3.9 % (ref 0–6.9)
ERYTHROCYTE [DISTWIDTH] IN BLOOD BY AUTOMATED COUNT: 49 FL (ref 35.9–50)
GFR SERPL CREATININE-BSD FRML MDRD: >60 ML/MIN/1.73 M 2
GLUCOSE BLD-MCNC: 102 MG/DL (ref 65–99)
GLUCOSE BLD-MCNC: 124 MG/DL (ref 65–99)
GLUCOSE SERPL-MCNC: 125 MG/DL (ref 65–99)
HCT VFR BLD AUTO: 38 % (ref 42–52)
HEMOCCULT STL QL: NEGATIVE
HGB BLD-MCNC: 11.8 G/DL (ref 14–18)
IMM GRANULOCYTES # BLD AUTO: 0.03 K/UL (ref 0–0.11)
IMM GRANULOCYTES NFR BLD AUTO: 0.4 % (ref 0–0.9)
LYMPHOCYTES # BLD AUTO: 1.2 K/UL (ref 1–4.8)
LYMPHOCYTES NFR BLD: 15.1 % (ref 22–41)
MAGNESIUM SERPL-MCNC: 1.5 MG/DL (ref 1.5–2.5)
MCH RBC QN AUTO: 30.7 PG (ref 27–33)
MCHC RBC AUTO-ENTMCNC: 31.1 G/DL (ref 33.7–35.3)
MCV RBC AUTO: 99 FL (ref 81.4–97.8)
MONOCYTES # BLD AUTO: 0.66 K/UL (ref 0–0.85)
MONOCYTES NFR BLD AUTO: 8.3 % (ref 0–13.4)
NEUTROPHILS # BLD AUTO: 5.73 K/UL (ref 1.82–7.42)
NEUTROPHILS NFR BLD: 71.9 % (ref 44–72)
NRBC # BLD AUTO: 0 K/UL
NRBC BLD AUTO-RTO: 0 /100 WBC
PHOSPHATE SERPL-MCNC: 4.4 MG/DL (ref 2.5–4.5)
PLATELET # BLD AUTO: 124 K/UL (ref 164–446)
PMV BLD AUTO: 11.8 FL (ref 9–12.9)
POTASSIUM SERPL-SCNC: 4.1 MMOL/L (ref 3.6–5.5)
RBC # BLD AUTO: 3.84 M/UL (ref 4.7–6.1)
SODIUM SERPL-SCNC: 137 MMOL/L (ref 135–145)
WBC # BLD AUTO: 8 K/UL (ref 4.8–10.8)

## 2017-07-26 PROCEDURE — 36415 COLL VENOUS BLD VENIPUNCTURE: CPT

## 2017-07-26 PROCEDURE — 80048 BASIC METABOLIC PNL TOTAL CA: CPT

## 2017-07-26 PROCEDURE — 700102 HCHG RX REV CODE 250 W/ 637 OVERRIDE(OP): Performed by: STUDENT IN AN ORGANIZED HEALTH CARE EDUCATION/TRAINING PROGRAM

## 2017-07-26 PROCEDURE — 93010 ELECTROCARDIOGRAM REPORT: CPT | Performed by: INTERNAL MEDICINE

## 2017-07-26 PROCEDURE — 82272 OCCULT BLD FECES 1-3 TESTS: CPT

## 2017-07-26 PROCEDURE — A9270 NON-COVERED ITEM OR SERVICE: HCPCS | Performed by: HOSPITALIST

## 2017-07-26 PROCEDURE — 700111 HCHG RX REV CODE 636 W/ 250 OVERRIDE (IP)

## 2017-07-26 PROCEDURE — 84100 ASSAY OF PHOSPHORUS: CPT

## 2017-07-26 PROCEDURE — A9270 NON-COVERED ITEM OR SERVICE: HCPCS | Performed by: INTERNAL MEDICINE

## 2017-07-26 PROCEDURE — 82962 GLUCOSE BLOOD TEST: CPT | Mod: 91

## 2017-07-26 PROCEDURE — A9270 NON-COVERED ITEM OR SERVICE: HCPCS | Performed by: STUDENT IN AN ORGANIZED HEALTH CARE EDUCATION/TRAINING PROGRAM

## 2017-07-26 PROCEDURE — 93005 ELECTROCARDIOGRAM TRACING: CPT | Performed by: HOSPITALIST

## 2017-07-26 PROCEDURE — 85025 COMPLETE CBC W/AUTO DIFF WBC: CPT

## 2017-07-26 PROCEDURE — 99239 HOSP IP/OBS DSCHRG MGMT >30: CPT | Performed by: INTERNAL MEDICINE

## 2017-07-26 PROCEDURE — 83735 ASSAY OF MAGNESIUM: CPT

## 2017-07-26 PROCEDURE — 82140 ASSAY OF AMMONIA: CPT

## 2017-07-26 PROCEDURE — 83880 ASSAY OF NATRIURETIC PEPTIDE: CPT

## 2017-07-26 PROCEDURE — 700102 HCHG RX REV CODE 250 W/ 637 OVERRIDE(OP): Performed by: HOSPITALIST

## 2017-07-26 PROCEDURE — 700102 HCHG RX REV CODE 250 W/ 637 OVERRIDE(OP): Performed by: INTERNAL MEDICINE

## 2017-07-26 RX ORDER — BUMETANIDE 0.5 MG/1
0.5 TABLET ORAL
Status: DISCONTINUED | OUTPATIENT
Start: 2017-07-26 | End: 2017-07-26 | Stop reason: HOSPADM

## 2017-07-26 RX ORDER — METOPROLOL TARTRATE 50 MG/1
50 TABLET, FILM COATED ORAL
Qty: 30 TAB | Refills: 0 | Status: SHIPPED | OUTPATIENT
Start: 2017-07-27

## 2017-07-26 RX ORDER — BUMETANIDE 0.5 MG/1
0.5 TABLET ORAL DAILY
Qty: 30 TAB | Refills: 0 | Status: SHIPPED | OUTPATIENT
Start: 2017-07-26

## 2017-07-26 RX ORDER — METOPROLOL TARTRATE 50 MG/1
50 TABLET, FILM COATED ORAL
Status: DISCONTINUED | OUTPATIENT
Start: 2017-07-27 | End: 2017-07-26 | Stop reason: HOSPADM

## 2017-07-26 RX ORDER — LORAZEPAM 2 MG/ML
INJECTION INTRAMUSCULAR
Status: COMPLETED
Start: 2017-07-26 | End: 2017-07-26

## 2017-07-26 RX ADMIN — BUMETANIDE 0.5 MG: 0.5 TABLET ORAL at 12:31

## 2017-07-26 RX ADMIN — RIVAROXABAN 20 MG: 20 TABLET, FILM COATED ORAL at 08:34

## 2017-07-26 RX ADMIN — METFORMIN HYDROCHLORIDE 500 MG: 500 TABLET, FILM COATED ORAL at 08:33

## 2017-07-26 RX ADMIN — LORAZEPAM: 2 INJECTION INTRAMUSCULAR; INTRAVENOUS at 01:30

## 2017-07-26 RX ADMIN — MAGNESIUM GLUCONATE 500 MG ORAL TABLET 400 MG: 500 TABLET ORAL at 08:33

## 2017-07-26 RX ADMIN — METOPROLOL TARTRATE 50 MG: 50 TABLET, FILM COATED ORAL at 08:33

## 2017-07-26 RX ADMIN — FOLIC ACID 1 MG: 1 TABLET ORAL at 08:33

## 2017-07-26 RX ADMIN — STANDARDIZED SENNA CONCENTRATE AND DOCUSATE SODIUM 2 TABLET: 8.6; 5 TABLET, FILM COATED ORAL at 08:34

## 2017-07-26 RX ADMIN — THERA TABS 1 TABLET: TAB at 08:33

## 2017-07-26 RX ADMIN — Medication 100 MG: at 08:34

## 2017-07-26 RX ADMIN — OMEPRAZOLE 20 MG: 20 CAPSULE, DELAYED RELEASE ORAL at 08:33

## 2017-07-26 RX ADMIN — LISINOPRIL 10 MG: 10 TABLET ORAL at 08:33

## 2017-07-26 RX ADMIN — LOSARTAN POTASSIUM 50 MG: 50 TABLET, FILM COATED ORAL at 08:33

## 2017-07-26 ASSESSMENT — LIFESTYLE VARIABLES
ANXIETY: NO ANXIETY (AT EASE)
VISUAL DISTURBANCES: NOT PRESENT
HEADACHE, FULLNESS IN HEAD: NOT PRESENT
PAROXYSMAL SWEATS: NO SWEAT VISIBLE
TREMOR: NO TREMOR
AUDITORY DISTURBANCES: NOT PRESENT
TOTAL SCORE: 0
AGITATION: NORMAL ACTIVITY
ORIENTATION AND CLOUDING OF SENSORIUM: ORIENTED AND CAN DO SERIAL ADDITIONS
NAUSEA AND VOMITING: NO NAUSEA AND NO VOMITING

## 2017-07-26 ASSESSMENT — PAIN SCALES - GENERAL
PAINLEVEL_OUTOF10: 0

## 2017-07-26 ASSESSMENT — ENCOUNTER SYMPTOMS
BLOOD IN STOOL: 0
PALPITATIONS: 0
FEVER: 0
BRUISES/BLEEDS EASILY: 0
SHORTNESS OF BREATH: 0

## 2017-07-26 NOTE — PROGRESS NOTES
Pt sitting up in chair. Sleeping on and off. Pt ready to go for a walk around unit. Will walk pt around unit. No complaints of pain.

## 2017-07-26 NOTE — PROGRESS NOTES
Pt ambulated around without oxygen per cardiology request.   Oxygen saturation stayed at 90% or above while walking without oxygen.

## 2017-07-26 NOTE — DISCHARGE INSTRUCTIONS
Discharge Instructions    Discharged to home by car with relative. Discharged via walking, hospital escort: Yes.  Special equipment needed: Not Applicable    Be sure to schedule a follow-up appointment with your primary care doctor or any specialists as instructed.     Discharge Plan: Follow up with Primary Care within a week.     Smoking Cessation Offered: Patient Refused  Influenza Vaccine Indication: Patient Refuses    I understand that a diet low in cholesterol, fat, and sodium is recommended for good health. Unless I have been given specific instructions below for another diet, I accept this instruction as my diet prescription.   Other diet:     Special Instructions: None    · Is patient discharged on Warfarin / Coumadin?   No     · Is patient Post Blood Transfusion?  No    Depression / Suicide Risk    As you are discharged from this [facility] facility, it is important to learn how to keep safe from harming yourself.    Recognize the warning signs:  · Abrupt changes in personality, positive or negative- including increase in energy   · Giving away possessions  · Change in eating patterns- significant weight changes-  positive or negative  · Change in sleeping patterns- unable to sleep or sleeping all the time   · Unwillingness or inability to communicate  · Depression  · Unusual sadness, discouragement and loneliness  · Talk of wanting to die  · Neglect of personal appearance   · Rebelliousness- reckless behavior  · Withdrawal from people/activities they love  · Confusion- inability to concentrate     If you or a loved one observes any of these behaviors or has concerns about self-harm, here's what you can do:  · Talk about it- your feelings and reasons for harming yourself  · Remove any means that you might use to hurt yourself (examples: pills, rope, extension cords, firearm)  · Get professional help from the community (Mental Health, Substance Abuse, psychological counseling)  · Do not be alone:Call your  Safe Contact- someone whom you trust who will be there for you.  · Call your local CRISIS HOTLINE 950-7805 or 474-561-5565  · Call your local Children's Mobile Crisis Response Team [place] (146) 600-1996 or www.MinuteBuzz  · Call the toll free National Suicide Prevention Hotlines   · National Suicide Prevention Lifeline 859-267-HXPX (1002)  · [employer] [facility] 800-SUICIDE (915-6312)    Atrial Fibrillation  Atrial fibrillation is a condition that causes your heart to beat irregularly. It may also cause your heart to beat faster than normal. Atrial fibrillation can prevent your heart from pumping blood normally. It increases your risk of stroke and heart problems.  HOME CARE  · Take medications as told by your doctor.  · Only take medications that your doctor says are safe. Some medications can make the condition worse or happen again.  · If blood thinners were prescribed by your doctor, take them exactly as told. Too much can cause bleeding. Too little and you will not have the needed protection against stroke and other problems.  · Perform blood tests at home if told by your doctor.  · Perform blood tests exactly as told by your doctor.  · Do not drink alcohol.  · Do not drink beverages with caffeine such as coffee, soda, and some teas.  · Maintain a healthy weight.  · Do not use diet pills unless your doctor says they are safe. They may make heart problems worse.  · Follow diet instructions as told by your doctor.  · Exercise regularly as told by your doctor.  · Keep all follow-up appointments.  GET HELP IF:  · You notice a change in the speed, rhythm, or strength of your heartbeat.  · You suddenly begin peeing (urinating) more often.  · You get tired more easily when moving or exercising.  GET HELP RIGHT AWAY IF:   · You have chest or belly (abdominal) pain.  · You feel sick to your stomach (nauseous).  · You are short of breath.  · You suddenly have swollen feet and ankles.  · You feel  dizzy.  · You face, arms, or legs feel numb or weak.  · There is a change in your vision or speech.  MAKE SURE YOU:   · Understand these instructions.  · Will watch your condition.  · Will get help right away if you are not doing well or get worse.     This information is not intended to replace advice given to you by your health care provider. Make sure you discuss any questions you have with your health care provider.     Document Released: 09/26/2009 Document Revised: 01/08/2016 Document Reviewed: 04/13/2016  [employer] Interactive Patient Education ©2016 [employer].    Sleep Apnea  Sleep apnea is disorder that affects a person's sleep. A person with sleep apnea has abnormal pauses in their breathing when they sleep. It is hard for them to get a good sleep. This makes a person tired during the day. It also can lead to other physical problems. There are three types of sleep apnea. One type is when breathing stops for a short time because your airway is blocked (obstructive sleep apnea). Another type is when the brain sometimes fails to give the normal signal to breathe to the muscles that control your breathing (central sleep apnea). The third type is a combination of the other two types.  HOME CARE  · Do not sleep on your back. Try to sleep on your side.  · Take all medicine as told by your doctor.  · Avoid alcohol, calming medicines (sedatives), and depressant drugs.  · Try to lose weight if you are overweight. Talk to your doctor about a healthy weight goal.  Your doctor may have you use a device that helps to open your airway. It can help you get the air that you need. It is called a positive airway pressure (PAP) device. There are three types of PAP devices:  · Continuous positive airway pressure (CPAP) device.  · Nasal expiratory positive airway pressure (EPAP) device.  · Bilevel positive airway pressure (BPAP) device.  MAKE SURE YOU:  · Understand these instructions.  · Will watch your condition.  · Will  get help right away if you are not doing well or get worse.     This information is not intended to replace advice given to you by your health care provider. Make sure you discuss any questions you have with your health care provider.     Document Released: 09/26/2009 Document Revised: 01/08/2016 Document Reviewed: 04/20/2013  [employer] Interactive Patient Education ©2016 Elsevier Inc.      Rivaroxaban oral tablets  What is this medicine?  RIVAROXABAN (ri va JERSEY a ban) is an anticoagulant (blood thinner). It is used to treat blood clots in the lungs or in the veins. It is also used after knee or hip surgeries to prevent blood clots. It is also used to lower the chance of stroke in people with a medical condition called atrial fibrillation.  This medicine may be used for other purposes; ask your health care provider or pharmacist if you have questions.  COMMON BRAND NAME(S): Xarelto  What should I tell my health care provider before I take this medicine?  They need to know if you have any of these conditions:  -bleeding disorders  -bleeding in the brain  -blood in your stools (black or tarry stools) or if you have blood in your vomit  -history of stomach bleeding  -kidney disease  -liver disease  -low blood counts, like low white cell, platelet, or red cell counts  -recent or planned spinal or epidural procedure  -take medicines that treat or prevent blood clots  -an unusual or allergic reaction to rivaroxaban, other medicines, foods, dyes, or preservatives  -pregnant or trying to get pregnant  -breast-feeding  How should I use this medicine?  Take this medicine by mouth with a glass of water. Follow the directions on the prescription label. Take your medicine at regular intervals. Do not take it more often than directed. Do not stop taking except on your doctor's advice.  If you are taking this medicine after hip or knee replacement surgery, take it with or without food.  If you are taking this medicine for atrial  fibrillation, take it with your evening meal. If you are taking this medicine to treat blood clots, take it with food at the same time each day. If you are unable to swallow your tablet, you may crush the tablet and mix it in applesauce. Then, immediately eat the applesauce. You should eat more food right after you eat the applesauce containing the crushed tablet.  Talk to your pediatrician regarding the use of this medicine in children. Special care may be needed.  Overdosage: If you think you've taken too much of this medicine contact a poison control center or emergency room at once.  Overdosage: If you think you have taken too much of this medicine contact a poison control center or emergency room at once.  NOTE: This medicine is only for you. Do not share this medicine with others.  What if I miss a dose?  If you take your medicine once a day and miss a dose, take the missed dose as soon as you remember. If you take your medicine twice a day and miss a dose, take the missed dose immediately. In this instance, 2 tablets may be taken at the same time. The next day you should take 1 tablet twice a day as directed.  What may interact with this medicine?  -aspirin and aspirin-like medicines  -certain antibiotics like erythromycin, azithromycin, and clarithromycin  -certain medicines for fungal infections like ketoconazole and itraconazole  -certain medicines for irregular heart beat like amiodarone, quinidine, dronedarone  -certain medicines for seizures like carbamazepine, phenytoin  -certain medicines that treat or prevent blood clots like warfarin, enoxaparin, and dalteparin   -conivaptan  -diltiazem  -felodipine  -indinavir  -lopinavir; ritonavir  -NSAIDS, medicines for pain and inflammation, like ibuprofen or naproxen  -ranolazine  -rifampin  -ritonavir  -Juan's wort  -verapamil  This list may not describe all possible interactions. Give your health care provider a list of all the medicines, herbs,  non-prescription drugs, or dietary supplements you use. Also tell them if you smoke, drink alcohol, or use illegal drugs. Some items may interact with your medicine.  What should I watch for while using this medicine?  Do not stop taking this medicine without first talking to your doctor. Stopping this medicine may increase your risk of having a stroke. Be sure to refill your prescription before you run out of medicine.  This medicine may increase your risk to bruise or bleed. Call your doctor or health care professional if you notice any unusual bleeding.  Be careful brushing and flossing your teeth or using a toothpick because you may bleed more easily. If you have any dental work done, tell your dentist you are receiving this medicine.  What side effects may I notice from receiving this medicine?  Side effects that you should report to your doctor or health care professional as soon as possible:  -allergic reactions like skin rash, itching or hives, swelling of the face, lips, or tongue  -back pain  -bloody or black, tarry stools  -changes in vision  -confusion, trouble speaking or understanding  -red or dark-brown urine  -redness, blistering, peeling or loosening of the skin, including inside the mouth  -severe headaches  -spitting up blood or brown material that looks like coffee grounds  -sudden numbness or weakness of the face, arm or leg  -trouble walking, dizziness, loss of balance or coordination  -unusual bruising or bleeding from the eye, gums, or nose   Side effects that usually do not require medical attention (Report these to your doctor or health care professional if they continue or are bothersome.):  -dizziness  -muscle pain  This list may not describe all possible side effects. Call your doctor for medical advice about side effects. You may report side effects to FDA at 6-968-FDA-5465.  Where should I keep my medicine?  Keep out of the reach of children.  Store at room temperature between 15 and  30 degrees C (59 and 86 degrees F). Throw away any unused medicine after the expiration date.  NOTE: This sheet is a summary. It may not cover all possible information. If you have questions about this medicine, talk to your doctor, pharmacist, or health care provider.  © 2014, Elsevier/Gold Standard. (3/17/2014 3:32:09 PM)

## 2017-07-26 NOTE — PROGRESS NOTES
[facility]    Date of Service: 2017    Chief Complaint  57 y.o. male admitted 2017 with sob/palpitation    Interval Problem Update  Breathes better; no palpitation at rest.  Denies withdrawal sx's.    Consultants/Specialty  Card    Disposition  Home when stable        Review of Systems   Constitutional: Negative for fever.   Eyes: Negative for blurred vision.   Respiratory: Negative for cough.    Cardiovascular: Negative for chest pain.   Gastrointestinal: Negative for heartburn.   Genitourinary: Negative for dysuria.   Musculoskeletal: Negative for myalgias.   Skin: Negative for rash.   Neurological: Negative for dizziness and headaches.      Physical Exam  Laboratory/Imaging   Hemodynamics  Temp (24hrs), Av.6 °C (97.9 °F), Min:36.4 °C (97.5 °F), Max:37.1 °C (98.8 °F)   Temperature: 36.4 °C (97.5 °F)  Pulse  Av.5  Min: 62  Max: 126   Blood Pressure: 115/76 mmHg      Respiratory      Respiration: 18, Pulse Oximetry: 92 %        RUL Breath Sounds: Clear, RML Breath Sounds: Clear, RLL Breath Sounds: Diminished, ANNA Breath Sounds: Clear, LLL Breath Sounds: Diminished    Fluids    Intake/Output Summary (Last 24 hours) at 17 1734  Last data filed at 17 1200   Gross per 24 hour   Intake    740 ml   Output    600 ml   Net    140 ml       Nutrition  Orders Placed This Encounter   Procedures   • Diet Order     Standing Status: Standing      Number of Occurrences: 1      Standing Expiration Date:      Order Specific Question:  Diet:     Answer:  Cardiac [6]     Physical Exam   Constitutional: He is oriented to person, place, and time. No distress.   HENT:   Head: Normocephalic.   Eyes: EOM are normal.   Neck: Neck supple.   Cardiovascular:   irreg   Pulmonary/Chest: Effort normal and breath sounds normal.   Abdominal: Soft. Bowel sounds are normal. He exhibits no distension. There is no tenderness.   Musculoskeletal:   2+ edema ble   Neurological: He is alert and oriented  to person, place, and time.   Skin: Skin is warm.   Psychiatric: His behavior is normal.       Recent Labs      07/24/17 0900 07/24/17   2353   WBC  7.6  7.0   RBC  4.03*  3.73*   HEMOGLOBIN  12.5*  11.4*   HEMATOCRIT  38.6*  36.1*   MCV  95.8  96.8   MCH  31.0  30.6   MCHC  32.4*  31.6*   RDW  47.7  48.6   PLATELETCT  148*  135*   MPV  11.8  11.4     Recent Labs      07/24/17 0900 07/24/17   2352   SODIUM  138  137   POTASSIUM  3.7  4.0   CHLORIDE  102  101   CO2  25  28   GLUCOSE  109*  125*   BUN  22  23*   CREATININE  1.12  1.24   CALCIUM  9.2  8.7     Recent Labs      07/24/17 0900   APTT  35.2   INR  1.14*     Recent Labs      07/24/17 0900 07/24/17   1509   BNPBTYPENAT  225*  251*     Recent Labs      07/24/17   2352   TRIGLYCERIDE  102   HDL  32*   LDL  62          Assessment/Plan     Afib, new dx, with initial RVR/narcisa at night: rate better controlled at rest although increased with activity; adjust meds for rate control per Card; Xarelto for anticoag.  Check Echo/TSH; advised Etoh cessation.    Prob MELISSA: need sleep study to confirm to initiate CPAP.    CHF: diurese with Lasix and monitor    HTN: adjust meds for good control    DM2:  Cont current rx; monitor glucose    Multiple lung nodules on R, <5mm: will need repeat CT in 6 months.    Etoh abuse; monitor for withdrawal; none now; Thiamine/MVI.  Core Measures

## 2017-07-26 NOTE — PROGRESS NOTES
Received report and assumed care of patient. Patient is alert and oriented x4. Patient is in no signs of distress and denies pain at this time. Patient sitting up in the chair at this time. Patient was updated on the plan of care for the day. Call light within reach, bed in low position, 2 side rails up. Educated on fall risk, verbalizes understanding. Will continue to monitor

## 2017-07-26 NOTE — PROGRESS NOTES
Received bedside report from Leigh WU .  Pt is a/o x 4  .  POC discussed w/ pt, verbalizes understanding,  Bed is locked, low, belongings in reach, call light in reach.

## 2017-07-26 NOTE — DISCHARGE PLANNING
The patient has a prescription for Xarelto.  I faxed his prescription down to [facility], where he preferred to go, and they called back and said that they have old insurance information on him.  I was told that if the patient can get down there by about 5:00 PM with the original prescription and his insurance card, then they can fill it.  I told the patient this and he said that he does have his insurance card so I gave him the original prescription and advised the nurse of the plan.

## 2017-07-26 NOTE — DISCHARGE SUMMARY
CHIEF COMPLAINT ON ADMISSION  Chief Complaint   Patient presents with   • Shortness of Breath       CODE STATUS  DNR    HPI & HOSPITAL COURSE  This is a 57 y.o. male here with sob/palpitation and found to be in afib RVR.  Patient was admitted and received meds for rate control, along with Xarelto for anticoag.  Patient's had periods of bradycardia at night but asymptomatic.  He also had e/o CHF for which diuresis was started with good results.  Patient was seen by Card in consultation.  He has h/o heavy alcohol abuse which may have precipitated the afib.  He's advised strongly to stop drinking.  Patient had echo done here (see results).  He is breathing much better and had no further palpitation at time of discharge. He has morbid obesity and suspected to have MELISSA, which should be investigated as outpatient.    Therefore, he is discharged in good and stable condition with close outpatient follow-up.    SPECIFIC OUTPATIENT FOLLOW-UP  F/u with PCP/card in 1 week (patient lives in [place].    DISCHARGE PROBLEM LIST  Active Problems:    Atrial fibrillation, new onset (CMS-HCC) POA: Yes    Morbid obesity (CMS-HCC) POA: Yes    Alcohol abuse POA: Yes    Obstructive sleep apnea POA: Yes    Hypertension POA: Yes    Diabetes mellitus type II, uncontrolled (CMS-HCC) POA: Yes    Gastroesophageal reflux disease POA: Yes    Coagulopathy (CMS-HCC) POA: Yes    Normochromic normocytic anemia POA: Yes    Thrombocytopenia (CMS-HCC) POA: Yes  Resolved Problems:    Hypomagnesemia POA: Yes      FOLLOW UP  No future appointments.  No follow-up provider specified.    MEDICATIONS ON DISCHARGE   Laith Moon   Home Medication Instructions LENNY:[id number]    Printed on:07/26/17 1437   Medication Information                      bumetanide (BUMEX) 0.5 MG Tab  Take 1 Tab by mouth every day.             losartan (COZAAR) 50 MG Tab  Take 50 mg by mouth every day.             magnesium oxide (MAG-OX) 400 (241.3 MG) MG Tab tablet  Take 1 Tab  by mouth 2 Times a Day.             metformin (GLUCOPHAGE) 500 MG Tab  Take 500 mg by mouth 2 times a day, with meals.             metoprolol (LOPRESSOR) 25 MG Tab  Take 1 Tab by mouth ONE-HALF HOUR AFTER DINNER.             metoprolol (LOPRESSOR) 50 MG Tab  Take 1 Tab by mouth ONE-HALF HOUR AFTER BREAKFAST.             omeprazole (PRILOSEC) 20 MG delayed-release capsule  Take 20 mg by mouth every day.             rivaroxaban (XARELTO) 20 MG Tab tablet  Take 1 Tab by mouth every day.                 DIET  Orders Placed This Encounter   Procedures   • Diet Order     Standing Status: Standing      Number of Occurrences: 1      Standing Expiration Date:      Order Specific Question:  Diet:     Answer:  Cardiac [6]       ACTIVITY  As tolerated.  Weight bearing as tolerated      CONSULTATIONS  Card    PROCEDURES  Echo    LABORATORY  Lab Results   Component Value Date/Time    SODIUM 137 07/26/2017 03:11 AM    POTASSIUM 4.1 07/26/2017 03:11 AM    CHLORIDE 103 07/26/2017 03:11 AM    CO2 28 07/26/2017 03:11 AM    GLUCOSE 125* 07/26/2017 03:11 AM    BUN 20 07/26/2017 03:11 AM    CREATININE 0.85 07/26/2017 03:11 AM        Lab Results   Component Value Date/Time    WBC 8.0 07/26/2017 03:11 AM    HEMOGLOBIN 11.8* 07/26/2017 03:11 AM    HEMATOCRIT 38.0* 07/26/2017 03:11 AM    PLATELET COUNT 124* 07/26/2017 03:11 AM        Total time of the discharge process exceeds 40 minutes

## 2017-07-26 NOTE — PROGRESS NOTES
.telemetry shift summary ./.10/.  A fib, occ pvcs, pauses from 3.2 to 3.8    Javier Sosa CNP updated of sinus pauses, will continue to monitor,  Labs drawn to check electrolytes.  Rested fair after taking 0.5mg of ativan for anxiety.  Denies pain, CIWA score remains negative.

## 2017-07-26 NOTE — CARE PLAN
Problem: Safety  Goal: Will remain free from falls  Outcome: PROGRESSING AS EXPECTED  Pt steady on feet. Pt assessed for fall risk. Proper precautions in place. Treaded slipper socks on, bed in low position, wheels locked, side rails up x2. Call light within reach.     Problem: Infection  Goal: Will remain free from infection  Outcome: PROGRESSING AS EXPECTED  Pt assessed for signs and symptoms of infection. Proper hand hygiene performed prior to entering an exiting pt's room. Pt educated on proper hand hygiene.

## 2017-07-26 NOTE — PROGRESS NOTES
Cardiology Progress Note               Author: Aneesh Farr Date & Time created: 2017  10:37 AM     Interval History:  Feeling better. Periods of bradycardia at night.  Pulmonary help appreciated getting him on BIPAP promptly.  Stool negative times one.  Room air oximetry over 90%    Review of Systems   Constitutional: Negative for fever.   Respiratory: Negative for shortness of breath.    Cardiovascular: Negative for chest pain and palpitations.   Gastrointestinal: Negative for blood in stool.   Endo/Heme/Allergies: Does not bruise/bleed easily.       Physical Exam   Constitutional: No distress.   Obese   HENT:   Head: Normocephalic and atraumatic.   Eyes: No scleral icterus.   Cardiovascular: Normal rate.  An irregularly irregular rhythm present.   Abdominal: Soft.   Musculoskeletal: He exhibits no edema.   Neurological: He is alert.   Hyper somnolent when sleeping   Skin: Skin is warm and dry.       Hemodynamics:  Temp (24hrs), Av.2 °C (97.2 °F), Min:35.9 °C (96.6 °F), Max:36.4 °C (97.6 °F)  Temperature: 36 °C (96.8 °F)  Pulse  Av.5  Min: 62  Max: 126  Blood Pressure: 117/78 mmHg     Respiratory:    Respiration: 20, Pulse Oximetry: 98 %        RUL Breath Sounds: Clear, RML Breath Sounds: Clear, RLL Breath Sounds: Diminished, ANNA Breath Sounds: Clear, LLL Breath Sounds: Diminished  Fluids:     Weight: (!) 167 kg (368 lb 2.7 oz)  GI/Nutrition:  Orders Placed This Encounter   Procedures   • Diet Order     Standing Status: Standing      Number of Occurrences: 1      Standing Expiration Date:      Order Specific Question:  Diet:     Answer:  Cardiac [6]     Lab Results:  Recent Labs      17   0900  17   2353  17   0311   WBC  7.6  7.0  8.0   RBC  4.03*  3.73*  3.84*   HEMOGLOBIN  12.5*  11.4*  11.8*   HEMATOCRIT  38.6*  36.1*  38.0*   MCV  95.8  96.8  99.0*   MCH  31.0  30.6  30.7   MCHC  32.4*  31.6*  31.1*   RDW  47.7  48.6  49.0   PLATELETCT  148*  135*  124*   MPV  11.8  11.4   11.8     Recent Labs      07/24/17   0900  07/24/17   2352  07/26/17   0311   SODIUM  138  137  137   POTASSIUM  3.7  4.0  4.1   CHLORIDE  102  101  103   CO2  25  28  28   GLUCOSE  109*  125*  125*   BUN  22  23*  20   CREATININE  1.12  1.24  0.85   CALCIUM  9.2  8.7  8.8     Recent Labs      07/24/17   0900   APTT  35.2   INR  1.14*     Recent Labs      07/24/17   0900  07/24/17   1509  07/26/17   0311   BNPBTYPENAT  225*  251*  208*     Recent Labs      07/24/17   0900  07/24/17   1509  07/24/17   1811  07/24/17   2352  07/26/17   0311   TROPONINI  0.03   --   0.05*  0.04   --    BNPBTYPENAT  225*  251*   --    --   208*     Recent Labs      07/24/17   2352   TRIGLYCERIDE  102   HDL  32*   LDL  62         Medical Decision Making, by Problem:  Active Hospital Problems    Diagnosis   • Atrial fibrillation, new onset (CMS-[facility]) [I48.91]   • Morbid obesity (CMS-[facility]) [E66.01]   • Alcohol abuse [F10.10]   • Obstructive sleep apnea [G47.33]   • Hypertension [I10]   • Diabetes mellitus type II, uncontrolled (CMS-[facility]) [E11.65]   • Gastroesophageal reflux disease [K21.9]   • Hypomagnesemia [E83.42]   • Coagulopathy (CMS-[facility]) [D68.9]   • Normochromic normocytic anemia [D64.9]   • Thrombocytopenia (CMS-[facility]) [D69.6]       Plan:  Atrial fib rate better.  On CHF medications.  No obvious bleeding source form his anemia, may be secondary to alcohol abuse.  Medications reviewed.  Will add daily dose of diuretic.  Can be discharged from cardia perspective      Core Measures

## 2017-07-27 NOTE — PROGRESS NOTES
Discharge instructions given to patient. Prescriptions given to patient. Discharge instructions given to patient at bedside, verbalizes understanding and states plans for follow-up with PCP. New and home medication review, post-discharge activity level and worsening of symptoms needing follow-up care discussed. Telemetry monitor/IV cathlon removed. All belongings accounted for, all questions answered at this time. Patient wheeled down to lobby by RN to wait for wife to pick him up.